# Patient Record
Sex: FEMALE | Race: BLACK OR AFRICAN AMERICAN | NOT HISPANIC OR LATINO | Employment: UNEMPLOYED | URBAN - METROPOLITAN AREA
[De-identification: names, ages, dates, MRNs, and addresses within clinical notes are randomized per-mention and may not be internally consistent; named-entity substitution may affect disease eponyms.]

---

## 2018-02-13 ENCOUNTER — HOSPITAL ENCOUNTER (OUTPATIENT)
Dept: OTHER | Facility: HOSPITAL | Age: 23
Discharge: HOME | End: 2018-02-13
Attending: PHYSICIAN ASSISTANT | Admitting: PHYSICIAN ASSISTANT

## 2018-02-13 LAB
ALBUMIN SERPL-MCNC: 3.7 G/DL (ref 3.5–4.8)
ALBUMIN/GLOB SERPL: 0.9 {RATIO} (ref 1–1.7)
ALP SERPL-CCNC: 50 IU/L (ref 32–91)
ALT SERPL-CCNC: 16 IU/L (ref 14–54)
AMPHETAMINES UR QL SCN: NEGATIVE
ANION GAP SERPL CALC-SCNC: 12.1 MMOL/L (ref 10–20)
AST SERPL-CCNC: 17 IU/L (ref 15–41)
BARBITURATES UR QL SCN: NEGATIVE
BASOPHILS # BLD AUTO: 0 10*3/UL (ref 0–0.2)
BASOPHILS NFR BLD AUTO: 0 % (ref 0–2)
BENZODIAZ UR QL SCN: NEGATIVE
BILIRUB SERPL-MCNC: 0.5 MG/DL (ref 0.3–1.2)
BUN SERPL-MCNC: 14 MG/DL (ref 8–20)
BUN/CREAT SERPL: 15.6 (ref 5.4–26.2)
BZE UR QL SCN: NEGATIVE
CALCIUM SERPL-MCNC: 9.2 MG/DL (ref 8.9–10.3)
CHLORIDE SERPL-SCNC: 102 MMOL/L (ref 101–111)
CHOLEST SERPL-MCNC: 182 MG/DL
CHOLEST/HDLC SERPL: 3.9 {RATIO}
CONV CO2: 27 MMOL/L (ref 22–32)
CONV LDL CHOLESTEROL DIRECT: 137 MG/DL (ref 0–100)
CONV TOTAL PROTEIN: 7.8 G/DL (ref 6.1–7.9)
CREAT 24H UR-MCNC: NORMAL MG/DL
CREAT UR-MCNC: 0.9 MG/DL (ref 0.4–1)
DIFFERENTIAL METHOD BLD: (no result)
EOSINOPHIL # BLD AUTO: 0.1 10*3/UL (ref 0–0.3)
EOSINOPHIL # BLD AUTO: 1 % (ref 0–3)
ERYTHROCYTE [DISTWIDTH] IN BLOOD BY AUTOMATED COUNT: 13.3 % (ref 11.5–14.5)
GLOBULIN UR ELPH-MCNC: 4.1 G/DL (ref 2.5–3.8)
GLUCOSE SERPL-MCNC: 87 MG/DL (ref 65–99)
HCT VFR BLD AUTO: 39.7 % (ref 35–49)
HDLC SERPL-MCNC: 47 MG/DL
HGB BLD-MCNC: 13.2 G/DL (ref 12–15)
LDLC/HDLC SERPL: 2.9 {RATIO}
LIPID INTERPRETATION: ABNORMAL
LYMPHOCYTES # BLD AUTO: 2.4 10*3/UL (ref 0.8–4.8)
LYMPHOCYTES NFR BLD AUTO: 41 % (ref 18–42)
MCH RBC QN AUTO: 28.8 PG (ref 26–32)
MCHC RBC AUTO-ENTMCNC: 33.1 G/DL (ref 32–36)
MCV RBC AUTO: 87 FL (ref 80–94)
METHADONE UR QL SCN: NEGATIVE
MONOCYTES # BLD AUTO: 0.4 10*3/UL (ref 0.1–1.3)
MONOCYTES NFR BLD AUTO: 8 % (ref 2–11)
NEUTROPHILS # BLD AUTO: 2.9 10*3/UL (ref 2.3–8.6)
NEUTROPHILS NFR BLD AUTO: 50 % (ref 50–75)
NRBC BLD AUTO-RTO: 0 /100{WBCS}
NRBC/RBC NFR BLD MANUAL: 0 10*3/UL
OPIATES TESTED UR SCN: NEGATIVE
PCP UR QL: NEGATIVE
PLATELET # BLD AUTO: 346 10*3/UL (ref 150–450)
PMV BLD AUTO: 8 FL (ref 7.4–10.4)
POTASSIUM SERPL-SCNC: 4.1 MMOL/L (ref 3.6–5.1)
RBC # BLD AUTO: 4.57 10*6/UL (ref 4–5.4)
SODIUM SERPL-SCNC: 137 MMOL/L (ref 136–144)
THC SERPLBLD CFM-MCNC: NEGATIVE NG/ML
TRIGL SERPL-MCNC: 40 MG/DL
VLDLC SERPL CALC-MCNC: 0 MG/DL
WBC # BLD AUTO: 5.8 10*3/UL (ref 4.5–11.5)

## 2018-02-16 LAB — NICOTINE UR-MCNC: 4 NG/ML

## 2018-03-11 ENCOUNTER — HOSPITAL ENCOUNTER (OUTPATIENT)
Dept: SLEEP MEDICINE | Facility: HOSPITAL | Age: 23
Discharge: HOME | End: 2018-03-11
Attending: INTERNAL MEDICINE | Admitting: INTERNAL MEDICINE

## 2018-03-29 ENCOUNTER — HOSPITAL ENCOUNTER (OUTPATIENT)
Dept: SLEEP MEDICINE | Facility: HOSPITAL | Age: 23
Discharge: HOME | End: 2018-03-29
Attending: INTERNAL MEDICINE | Admitting: INTERNAL MEDICINE

## 2018-05-25 ENCOUNTER — HOSPITAL ENCOUNTER (OUTPATIENT)
Dept: PREOP | Facility: HOSPITAL | Age: 23
Setting detail: HOSPITAL OUTPATIENT SURGERY
Discharge: HOME | End: 2018-05-25
Attending: SURGERY | Admitting: SURGERY

## 2018-08-29 ENCOUNTER — HOSPITAL ENCOUNTER (OUTPATIENT)
Dept: CARDIOLOGY | Facility: HOSPITAL | Age: 23
Discharge: HOME | End: 2018-08-29
Attending: INTERNAL MEDICINE | Admitting: INTERNAL MEDICINE

## 2019-07-11 LAB
EXTERNAL ABO GROUPING: NORMAL
EXTERNAL HEPATITIS B SURFACE ANTIGEN: NEGATIVE
EXTERNAL HEPATITIS C AB: NORMAL
EXTERNAL RH FACTOR: POSITIVE
EXTERNAL RUBELLA QUALITATIVE: NORMAL
EXTERNAL SYPHILIS RPR SCREEN: NORMAL
EXTERNAL VDRL: NON REACTIVE
HIV1 P24 AG SERPL QL IA: NEGATIVE

## 2019-12-03 ENCOUNTER — TELEPHONE (OUTPATIENT)
Dept: ENDOCRINOLOGY | Facility: CLINIC | Age: 24
End: 2019-12-03

## 2019-12-04 ENCOUNTER — OFFICE VISIT (OUTPATIENT)
Dept: ENDOCRINOLOGY | Facility: CLINIC | Age: 24
End: 2019-12-04

## 2019-12-04 DIAGNOSIS — O24.410 DIET CONTROLLED GESTATIONAL DIABETES MELLITUS (GDM) IN THIRD TRIMESTER: ICD-10-CM

## 2019-12-04 DIAGNOSIS — O24.419 GESTATIONAL DIABETES MELLITUS (GDM), ANTEPARTUM, GESTATIONAL DIABETES METHOD OF CONTROL UNSPECIFIED: Primary | ICD-10-CM

## 2019-12-04 PROBLEM — Z98.84 STATUS POST BARIATRIC SURGERY: Status: ACTIVE | Noted: 2018-11-05

## 2019-12-04 PROBLEM — Z01.810 PREOPERATIVE CARDIOVASCULAR EXAMINATION: Status: ACTIVE | Noted: 2018-08-14

## 2019-12-04 PROBLEM — G47.33 OBSTRUCTIVE SLEEP APNEA: Status: ACTIVE | Noted: 2018-10-16

## 2019-12-04 PROBLEM — F32.9 MAJOR DEPRESSIVE DISORDER, SINGLE EPISODE: Status: ACTIVE | Noted: 2018-02-06

## 2019-12-04 PROBLEM — R12 HEARTBURN: Status: ACTIVE | Noted: 2018-02-06

## 2019-12-04 PROBLEM — E66.01 MORBID (SEVERE) OBESITY DUE TO EXCESS CALORIES (HCC): Status: ACTIVE | Noted: 2018-02-06

## 2019-12-04 PROBLEM — R94.31 ABNORMAL EKG: Status: ACTIVE | Noted: 2018-08-14

## 2019-12-04 PROBLEM — F41.9 ANXIETY DISORDER: Status: ACTIVE | Noted: 2018-02-06

## 2019-12-04 PROBLEM — G43.909 MIGRAINE WITHOUT STATUS MIGRAINOSUS, NOT INTRACTABLE: Status: ACTIVE | Noted: 2018-02-06

## 2019-12-04 PROBLEM — Z01.818 ENCOUNTER FOR OTHER PREPROCEDURAL EXAMINATION: Status: ACTIVE | Noted: 2018-02-13

## 2019-12-04 LAB — GLUCOSE BLDC GLUCOMTR-MCNC: 69 MG/DL (ref 70–130)

## 2019-12-04 PROCEDURE — 99203 OFFICE O/P NEW LOW 30 MIN: CPT | Performed by: INTERNAL MEDICINE

## 2019-12-04 PROCEDURE — 82962 GLUCOSE BLOOD TEST: CPT | Performed by: INTERNAL MEDICINE

## 2019-12-04 RX ORDER — LANCETS 33 GAUGE
EACH MISCELLANEOUS
Qty: 100 EACH | Refills: 2 | Status: SHIPPED | OUTPATIENT
Start: 2019-12-04 | End: 2019-12-06 | Stop reason: CLARIF

## 2019-12-04 RX ORDER — FLUOXETINE HYDROCHLORIDE 20 MG/1
1 CAPSULE ORAL EVERY 24 HOURS
COMMUNITY
Start: 2018-02-06 | End: 2019-12-04

## 2019-12-04 RX ORDER — ACETAMINOPHEN, ASPIRIN AND CAFFEINE 250; 250; 65 MG/1; MG/1; MG/1
1 TABLET, FILM COATED ORAL AS NEEDED
COMMUNITY
End: 2020-01-26 | Stop reason: HOSPADM

## 2019-12-04 RX ORDER — BLOOD SUGAR DIAGNOSTIC
STRIP MISCELLANEOUS
Qty: 100 EACH | Refills: 2 | Status: SHIPPED | OUTPATIENT
Start: 2019-12-04 | End: 2019-12-06 | Stop reason: CLARIF

## 2019-12-04 RX ORDER — NORGESTIMATE AND ETHINYL ESTRADIOL 7DAYSX3 28
1 KIT ORAL DAILY
COMMUNITY
Start: 2018-03-12 | End: 2019-12-04

## 2019-12-04 RX ORDER — MONTELUKAST SODIUM 10 MG/1
10 TABLET ORAL DAILY
COMMUNITY
Start: 2018-02-06 | End: 2019-12-04

## 2019-12-04 RX ORDER — ACETAMINOPHEN 500 MG
500 TABLET ORAL AS NEEDED
COMMUNITY
End: 2020-01-26 | Stop reason: HOSPADM

## 2019-12-04 RX ORDER — FLUTICASONE PROPIONATE 50 MCG
SPRAY, SUSPENSION (ML) NASAL AS NEEDED
COMMUNITY
Start: 2017-12-11 | End: 2020-01-26 | Stop reason: HOSPADM

## 2019-12-04 RX ORDER — HYDROXYZINE HYDROCHLORIDE 25 MG/1
25 TABLET, FILM COATED ORAL
COMMUNITY
Start: 2019-03-06 | End: 2019-12-04

## 2019-12-04 RX ORDER — BUPROPION HYDROCHLORIDE 300 MG/1
300 TABLET ORAL DAILY
COMMUNITY
Start: 2017-12-11 | End: 2019-12-04

## 2019-12-04 NOTE — PATIENT INSTRUCTIONS
Attend gestational diabetes class as scheduled on 12/13 1p..  Check blood sugar fasting & 2h after meals.  Check urine for ketones every morning.  Blood sugar goals:  Under 90 fasting  Under 120 2h after meals.  Send blood sugar records every week for adjustments.

## 2019-12-06 DIAGNOSIS — O24.419 GESTATIONAL DIABETES MELLITUS (GDM), ANTEPARTUM, GESTATIONAL DIABETES METHOD OF CONTROL UNSPECIFIED: ICD-10-CM

## 2019-12-06 RX ORDER — LANCETS
EACH MISCELLANEOUS
Qty: 150 EACH | Refills: 5 | Status: SHIPPED | OUTPATIENT
Start: 2019-12-06

## 2019-12-06 RX ORDER — BLOOD-GLUCOSE METER
1 EACH MISCELLANEOUS DAILY
Qty: 1 KIT | Refills: 1 | Status: SHIPPED | OUTPATIENT
Start: 2019-12-06

## 2019-12-09 VITALS
HEART RATE: 91 BPM | SYSTOLIC BLOOD PRESSURE: 134 MMHG | DIASTOLIC BLOOD PRESSURE: 68 MMHG | WEIGHT: 253 LBS | HEIGHT: 66 IN | OXYGEN SATURATION: 96 % | BODY MASS INDEX: 40.66 KG/M2

## 2019-12-10 ENCOUNTER — TELEPHONE (OUTPATIENT)
Dept: BARIATRICS/WEIGHT MGMT | Facility: CLINIC | Age: 24
End: 2019-12-10

## 2019-12-10 NOTE — PROGRESS NOTES
[facility]    Referring Provider: Dr. Rukhsana Acuna  Reason for Consultation: Gestational Diabetes evaluation & management.    Patient Care Team:  Kash Norton MD as PCP - General    Chief complaint Gestational Diabetes (32 weeks)      Subjective .     History of present illness:    This is a  24 y.o. female with Gestational Diabetes, diagnosed on 11/21/2019. Currently @ 32 weeks gestation. Due date 1/31/2020.  This is her 1st pregnancy.   Had nausea in the beginning of the pregnancy.  Previous gestational diabetes: N/A   Had gastric sleeve bariatric surgery in October 2018. Lost 50 lb in 6 months.  Menarche @ age 9y. Menses regular. LMP in March.    Review of Systems  Review of Systems   Constitutional: Positive for unexpected weight change.   HENT: Negative for trouble swallowing.    Eyes: Positive for visual disturbance.   Respiratory: Positive for shortness of breath.         Snoring   Cardiovascular: Negative for leg swelling.   Gastrointestinal: Negative for constipation and nausea.   Endocrine: Negative for polyuria.   Genitourinary: Negative for dysuria.        Nocturia   Neurological: Positive for dizziness and headaches. Negative for tremors and numbness.     So far has gained 30 lb this pregnancy.  History  Past Medical History:   Diagnosis Date   • Gestational diabetes      Past Surgical History:   Procedure Laterality Date   • GASTRIC SLEEVE LAPAROSCOPIC       Family History   Problem Relation Age of Onset   • Diabetes Maternal Aunt    • Hypertension Maternal Aunt    • Diabetes Maternal Grandmother    • Hypertension Maternal Grandmother      Social History     Tobacco Use   • Smoking status: Never Smoker   • Smokeless tobacco: Never Used   Substance Use Topics   • Alcohol use: No     Frequency: Never   • Drug use: No           Allergies:  Patient has no known allergies.    Objective     Vital Signs   Vitals:    12/04/19 0927   BP: 134/68   Pulse: 91   SpO2: 96%        Physical Exam:     General:    Alert, cooperative, in no acute distress. Obese   Head:    Normocephalic, without obvious abnormality, atraumatic   Eyes:            Lids and lashes normal, conjunctivae and sclerae normal, PERRLA   Throat:   No oral lesions,  oral mucosa moist   Neck:   Acanthosis nigricans,  no thyromegaly, no   carotid bruit   Lungs:     Clear    Abdomen:     Normal bowel sounds, soft. 32 weeks gestation                 Extremities:   Moves all extremities well, no edema               Pulses:   Pulses palpable and equal bilaterally   Skin:   No rash   Neurologic:  DTR 1+, able to feel the 10g monofilament       Results Review  I have reviewed the patient's new clinical results, labs & imaging.    Glucose, gestational screening ( 50g ) 157  on 11/18/2019    OGTT ( 100 g ): fasting glucose 73, 1h 197 ,  2h 167 , 3h 56  on 11/21/2019    Blood sugar in clinic today 69 4h pp    Assessment/Plan     Gestational Diabetes     Attend gestational diabetes class as scheduled on 12/13 1p.  Check blood sugar fasting & 2h after meals.  Check urine for ketones every morning.  Blood sugar goals:  Under 90 fasting  Under 120 2h after meals.  Send blood sugar records every week for adjustments.    Met with diabetes educator & taught blood sugar testing. Given a meter.    I discussed the patients findings and my recommendations with patient    Sally Blood MD  12/09/19  10:00 PM

## 2019-12-13 ENCOUNTER — OFFICE VISIT (OUTPATIENT)
Dept: ENDOCRINOLOGY | Facility: CLINIC | Age: 24
End: 2019-12-13

## 2019-12-13 DIAGNOSIS — O24.410 DIET CONTROLLED GESTATIONAL DIABETES MELLITUS (GDM) IN THIRD TRIMESTER: ICD-10-CM

## 2019-12-13 PROCEDURE — G0109 DIAB MANAGE TRN IND/GROUP: HCPCS | Performed by: DIETITIAN, REGISTERED

## 2019-12-18 ENCOUNTER — TELEPHONE (OUTPATIENT)
Dept: ENDOCRINOLOGY | Facility: CLINIC | Age: 24
End: 2019-12-18

## 2019-12-26 ENCOUNTER — TELEPHONE (OUTPATIENT)
Dept: ENDOCRINOLOGY | Facility: CLINIC | Age: 24
End: 2019-12-26

## 2020-01-02 ENCOUNTER — TELEPHONE (OUTPATIENT)
Dept: ENDOCRINOLOGY | Facility: CLINIC | Age: 25
End: 2020-01-02

## 2020-01-07 LAB — EXTERNAL GROUP B STREP ANTIGEN: NEGATIVE

## 2020-01-08 ENCOUNTER — TELEPHONE (OUTPATIENT)
Dept: ENDOCRINOLOGY | Facility: CLINIC | Age: 25
End: 2020-01-08

## 2020-01-08 NOTE — TELEPHONE ENCOUNTER
BG sent  Left vm about watching carbs as there were a few scattered high BG  Will reevaluate next week  BG scanned

## 2020-01-15 ENCOUNTER — TELEPHONE (OUTPATIENT)
Dept: ENDOCRINOLOGY | Facility: CLINIC | Age: 25
End: 2020-01-15

## 2020-01-21 ENCOUNTER — TELEPHONE (OUTPATIENT)
Dept: ENDOCRINOLOGY | Facility: CLINIC | Age: 25
End: 2020-01-21

## 2020-01-22 ENCOUNTER — TELEPHONE (OUTPATIENT)
Dept: ENDOCRINOLOGY | Facility: CLINIC | Age: 25
End: 2020-01-22

## 2020-01-23 ENCOUNTER — HOSPITAL ENCOUNTER (INPATIENT)
Facility: HOSPITAL | Age: 25
LOS: 2 days | Discharge: HOME | End: 2020-01-26
Attending: OBSTETRICS & GYNECOLOGY | Admitting: OBSTETRICS & GYNECOLOGY

## 2020-01-23 PROBLEM — Z34.90 PREGNANT: Status: ACTIVE | Noted: 2020-01-23

## 2020-01-23 LAB
ABO GROUP BLD: NORMAL
BLD GP AB SCN SERPL QL: NEGATIVE
DEPRECATED RDW RBC AUTO: 43.3 FL (ref 37–54)
ERYTHROCYTE [DISTWIDTH] IN BLOOD BY AUTOMATED COUNT: 13.9 % (ref 12.3–15.4)
GLUCOSE BLDC GLUCOMTR-MCNC: 94 MG/DL (ref 70–105)
HCT VFR BLD AUTO: 38.8 % (ref 34–46.6)
HGB BLD-MCNC: 12.9 G/DL (ref 12–15.9)
HIV1+2 AB SER QL: NORMAL
MCH RBC QN AUTO: 29.2 PG (ref 26.6–33)
MCHC RBC AUTO-ENTMCNC: 33.2 G/DL (ref 31.5–35.7)
MCV RBC AUTO: 87.9 FL (ref 79–97)
PLATELET # BLD AUTO: 302 10*3/MM3 (ref 140–450)
PMV BLD AUTO: 8.9 FL (ref 6–12)
RBC # BLD AUTO: 4.42 10*6/MM3 (ref 3.77–5.28)
RH BLD: POSITIVE
T&S EXPIRATION DATE: NORMAL
WBC NRBC COR # BLD: 9 10*3/MM3 (ref 3.4–10.8)

## 2020-01-23 PROCEDURE — 86901 BLOOD TYPING SEROLOGIC RH(D): CPT

## 2020-01-23 PROCEDURE — 82962 GLUCOSE BLOOD TEST: CPT

## 2020-01-23 PROCEDURE — 86901 BLOOD TYPING SEROLOGIC RH(D): CPT | Performed by: OBSTETRICS & GYNECOLOGY

## 2020-01-23 PROCEDURE — G0432 EIA HIV-1/HIV-2 SCREEN: HCPCS | Performed by: OBSTETRICS & GYNECOLOGY

## 2020-01-23 PROCEDURE — 86592 SYPHILIS TEST NON-TREP QUAL: CPT | Performed by: OBSTETRICS & GYNECOLOGY

## 2020-01-23 PROCEDURE — 86900 BLOOD TYPING SEROLOGIC ABO: CPT

## 2020-01-23 PROCEDURE — 86850 RBC ANTIBODY SCREEN: CPT | Performed by: OBSTETRICS & GYNECOLOGY

## 2020-01-23 PROCEDURE — 86900 BLOOD TYPING SEROLOGIC ABO: CPT | Performed by: OBSTETRICS & GYNECOLOGY

## 2020-01-23 PROCEDURE — 85027 COMPLETE CBC AUTOMATED: CPT | Performed by: OBSTETRICS & GYNECOLOGY

## 2020-01-23 RX ORDER — ZOLPIDEM TARTRATE 5 MG/1
5 TABLET ORAL NIGHTLY PRN
Status: DISCONTINUED | OUTPATIENT
Start: 2020-01-23 | End: 2020-01-23

## 2020-01-23 RX ORDER — ACETAMINOPHEN 325 MG/1
650 TABLET ORAL EVERY 4 HOURS PRN
Status: DISCONTINUED | OUTPATIENT
Start: 2020-01-23 | End: 2020-01-24

## 2020-01-23 RX ORDER — OXYTOCIN-SODIUM CHLORIDE 0.9% IV SOLN 30 UNIT/500ML 30-0.9/5 UT/ML-%
2 SOLUTION INTRAVENOUS
Status: DISCONTINUED | OUTPATIENT
Start: 2020-01-24 | End: 2020-01-24

## 2020-01-23 RX ORDER — INSULIN ASPART 100 [IU]/ML
4 INJECTION, SOLUTION INTRAVENOUS; SUBCUTANEOUS
COMMUNITY
End: 2020-01-23 | Stop reason: SDUPTHER

## 2020-01-23 RX ORDER — INSULIN ASPART 100 [IU]/ML
INJECTION, SOLUTION INTRAVENOUS; SUBCUTANEOUS
Qty: 1 PEN | Refills: 0 | Status: ON HOLD | OUTPATIENT
Start: 2020-01-23 | End: 2020-01-24 | Stop reason: SDUPTHER

## 2020-01-23 RX ORDER — MAGNESIUM CARB/ALUMINUM HYDROX 105-160MG
30 TABLET,CHEWABLE ORAL ONCE
Status: DISCONTINUED | OUTPATIENT
Start: 2020-01-24 | End: 2020-01-24

## 2020-01-23 RX ORDER — BUTORPHANOL TARTRATE 1 MG/ML
1 INJECTION, SOLUTION INTRAMUSCULAR; INTRAVENOUS
Status: DISCONTINUED | OUTPATIENT
Start: 2020-01-23 | End: 2020-01-24

## 2020-01-23 NOTE — TELEPHONE ENCOUNTER
Mom called back and I explained risks of elevated BG's during pregnancy and importance of taking insulin to control her pp BG's.  Asked mom to tell pt to send in her BG's on Monday for review and possible insulin dose adjustment. Pt goes in for delivery on Friday, 1/31/20.

## 2020-01-23 NOTE — TELEPHONE ENCOUNTER
Pt emailed back that she will start on insulin but to call her mom with information. Attempted to call [phone] but recording said VM box was not set up yet. Faxed OB letter that pt agreed to start insulin.

## 2020-01-23 NOTE — TELEPHONE ENCOUNTER
Pt called upset that her OB office called her saying she needed another FBG & A1C at OB office due to her elevated pp supper BG's.  Called pt and discussed with pt that she needs to start supper time Novolog 4 units starting today even though she only has one week left.  Pt is very reluctant to start insulin. Pt states she does not want to start insulin until she talks with her mom.  Called OB office with the above info for documentation.

## 2020-01-24 ENCOUNTER — ANESTHESIA EVENT (OUTPATIENT)
Dept: LABOR AND DELIVERY | Facility: HOSPITAL | Age: 25
End: 2020-01-24

## 2020-01-24 ENCOUNTER — ANESTHESIA (OUTPATIENT)
Dept: LABOR AND DELIVERY | Facility: HOSPITAL | Age: 25
End: 2020-01-24

## 2020-01-24 PROBLEM — Z34.90 PREGNANT: Status: RESOLVED | Noted: 2020-01-23 | Resolved: 2020-01-24

## 2020-01-24 LAB
GLUCOSE BLDC GLUCOMTR-MCNC: 78 MG/DL (ref 70–105)
GLUCOSE BLDC GLUCOMTR-MCNC: 82 MG/DL (ref 70–105)
GLUCOSE BLDC GLUCOMTR-MCNC: 84 MG/DL (ref 70–105)
GLUCOSE BLDC GLUCOMTR-MCNC: 87 MG/DL (ref 70–105)
RPR SER QL: NORMAL

## 2020-01-24 PROCEDURE — 25010000002 BUTORPHANOL PER 1 MG: Performed by: OBSTETRICS & GYNECOLOGY

## 2020-01-24 PROCEDURE — C1755 CATHETER, INTRASPINAL: HCPCS | Performed by: ANESTHESIOLOGY

## 2020-01-24 PROCEDURE — 0KQM0ZZ REPAIR PERINEUM MUSCLE, OPEN APPROACH: ICD-10-PCS | Performed by: OBSTETRICS & GYNECOLOGY

## 2020-01-24 PROCEDURE — 10907ZC DRAINAGE OF AMNIOTIC FLUID, THERAPEUTIC FROM PRODUCTS OF CONCEPTION, VIA NATURAL OR ARTIFICIAL OPENING: ICD-10-PCS | Performed by: OBSTETRICS & GYNECOLOGY

## 2020-01-24 PROCEDURE — 82962 GLUCOSE BLOOD TEST: CPT

## 2020-01-24 RX ORDER — SODIUM CHLORIDE 0.9 % (FLUSH) 0.9 %
1-10 SYRINGE (ML) INJECTION AS NEEDED
Status: DISCONTINUED | OUTPATIENT
Start: 2020-01-24 | End: 2020-01-26 | Stop reason: HOSPADM

## 2020-01-24 RX ORDER — OXYTOCIN-SODIUM CHLORIDE 0.9% IV SOLN 30 UNIT/500ML 30-0.9/5 UT/ML-%
125 SOLUTION INTRAVENOUS CONTINUOUS PRN
Status: COMPLETED | OUTPATIENT
Start: 2020-01-24 | End: 2020-01-24

## 2020-01-24 RX ORDER — ONDANSETRON 4 MG/1
4 TABLET, FILM COATED ORAL EVERY 8 HOURS PRN
Status: DISCONTINUED | OUTPATIENT
Start: 2020-01-24 | End: 2020-01-26 | Stop reason: HOSPADM

## 2020-01-24 RX ORDER — DIPHENHYDRAMINE HYDROCHLORIDE 50 MG/ML
12.5 INJECTION INTRAMUSCULAR; INTRAVENOUS EVERY 8 HOURS PRN
Status: DISCONTINUED | OUTPATIENT
Start: 2020-01-24 | End: 2020-01-24

## 2020-01-24 RX ORDER — ONDANSETRON 4 MG/1
4 TABLET, FILM COATED ORAL EVERY 6 HOURS PRN
Status: DISCONTINUED | OUTPATIENT
Start: 2020-01-24 | End: 2020-01-24 | Stop reason: HOSPADM

## 2020-01-24 RX ORDER — OXYTOCIN-SODIUM CHLORIDE 0.9% IV SOLN 30 UNIT/500ML 30-0.9/5 UT/ML-%
999 SOLUTION INTRAVENOUS ONCE
Status: DISCONTINUED | OUTPATIENT
Start: 2020-01-24 | End: 2020-01-24 | Stop reason: HOSPADM

## 2020-01-24 RX ORDER — BUPIVACAINE HYDROCHLORIDE 2.5 MG/ML
INJECTION, SOLUTION EPIDURAL; INFILTRATION; INTRACAUDAL
Status: DISPENSED
Start: 2020-01-24 | End: 2020-01-25

## 2020-01-24 RX ORDER — DOCUSATE SODIUM 100 MG/1
100 CAPSULE, LIQUID FILLED ORAL 2 TIMES DAILY
Status: DISCONTINUED | OUTPATIENT
Start: 2020-01-24 | End: 2020-01-26 | Stop reason: HOSPADM

## 2020-01-24 RX ORDER — LIDOCAINE HYDROCHLORIDE AND EPINEPHRINE 10; 10 MG/ML; UG/ML
INJECTION, SOLUTION INFILTRATION; PERINEURAL AS NEEDED
Status: DISCONTINUED | OUTPATIENT
Start: 2020-01-24 | End: 2020-01-24 | Stop reason: SURG

## 2020-01-24 RX ORDER — EPHEDRINE SULFATE 50 MG/ML
5 INJECTION, SOLUTION INTRAVENOUS
Status: DISCONTINUED | OUTPATIENT
Start: 2020-01-24 | End: 2020-01-24

## 2020-01-24 RX ORDER — CARBOPROST TROMETHAMINE 250 UG/ML
250 INJECTION, SOLUTION INTRAMUSCULAR AS NEEDED
Status: DISCONTINUED | OUTPATIENT
Start: 2020-01-24 | End: 2020-01-24 | Stop reason: HOSPADM

## 2020-01-24 RX ORDER — INSULIN ASPART 100 [IU]/ML
INJECTION, SOLUTION INTRAVENOUS; SUBCUTANEOUS
Qty: 1 PEN | Refills: 0 | OUTPATIENT
Start: 2020-01-24 | End: 2020-01-26 | Stop reason: HOSPADM

## 2020-01-24 RX ORDER — OXYTOCIN-SODIUM CHLORIDE 0.9% IV SOLN 30 UNIT/500ML 30-0.9/5 UT/ML-%
250 SOLUTION INTRAVENOUS CONTINUOUS
Status: DISPENSED | OUTPATIENT
Start: 2020-01-24 | End: 2020-01-24

## 2020-01-24 RX ORDER — LANOLIN 100 %
OINTMENT (GRAM) TOPICAL
Status: DISCONTINUED | OUTPATIENT
Start: 2020-01-24 | End: 2020-01-26 | Stop reason: HOSPADM

## 2020-01-24 RX ORDER — SODIUM CHLORIDE, SODIUM LACTATE, POTASSIUM CHLORIDE, CALCIUM CHLORIDE 600; 310; 30; 20 MG/100ML; MG/100ML; MG/100ML; MG/100ML
125 INJECTION, SOLUTION INTRAVENOUS CONTINUOUS
Status: DISCONTINUED | OUTPATIENT
Start: 2020-01-24 | End: 2020-01-24

## 2020-01-24 RX ORDER — METHYLERGONOVINE MALEATE 0.2 MG/ML
200 INJECTION INTRAVENOUS ONCE AS NEEDED
Status: DISCONTINUED | OUTPATIENT
Start: 2020-01-24 | End: 2020-01-24 | Stop reason: HOSPADM

## 2020-01-24 RX ORDER — PRENATAL VIT/IRON FUM/FOLIC AC 27MG-0.8MG
1 TABLET ORAL DAILY
Status: DISCONTINUED | OUTPATIENT
Start: 2020-01-25 | End: 2020-01-26 | Stop reason: HOSPADM

## 2020-01-24 RX ORDER — MISOPROSTOL 200 UG/1
800 TABLET ORAL AS NEEDED
Status: DISCONTINUED | OUTPATIENT
Start: 2020-01-24 | End: 2020-01-24 | Stop reason: HOSPADM

## 2020-01-24 RX ORDER — ONDANSETRON 2 MG/ML
4 INJECTION INTRAMUSCULAR; INTRAVENOUS EVERY 6 HOURS PRN
Status: DISCONTINUED | OUTPATIENT
Start: 2020-01-24 | End: 2020-01-24 | Stop reason: HOSPADM

## 2020-01-24 RX ORDER — ONDANSETRON 2 MG/ML
4 INJECTION INTRAMUSCULAR; INTRAVENOUS ONCE AS NEEDED
Status: DISCONTINUED | OUTPATIENT
Start: 2020-01-24 | End: 2020-01-24

## 2020-01-24 RX ORDER — IBUPROFEN 600 MG/1
600 TABLET ORAL EVERY 6 HOURS PRN
Status: DISCONTINUED | OUTPATIENT
Start: 2020-01-24 | End: 2020-01-26 | Stop reason: HOSPADM

## 2020-01-24 RX ADMIN — WITCH HAZEL 1 PAD: 500 SOLUTION RECTAL; TOPICAL at 20:27

## 2020-01-24 RX ADMIN — Medication 10 ML/HR: at 04:51

## 2020-01-24 RX ADMIN — BENZOCAINE 57 SPRAY: 11.4 AEROSOL, SPRAY TOPICAL at 20:28

## 2020-01-24 RX ADMIN — EPHEDRINE SULFATE 5 MG: 50 INJECTION, SOLUTION INTRAVENOUS at 05:34

## 2020-01-24 RX ADMIN — SODIUM CHLORIDE, SODIUM LACTATE, POTASSIUM CHLORIDE, AND CALCIUM CHLORIDE 1000 ML: 600; 310; 30; 20 INJECTION, SOLUTION INTRAVENOUS at 00:11

## 2020-01-24 RX ADMIN — OXYTOCIN 125 ML/HR: 10 INJECTION INTRAVENOUS at 20:27

## 2020-01-24 RX ADMIN — BUTORPHANOL TARTRATE 1 MG: 1 INJECTION, SOLUTION INTRAMUSCULAR; INTRAVENOUS at 02:58

## 2020-01-24 RX ADMIN — SODIUM CHLORIDE, SODIUM LACTATE, POTASSIUM CHLORIDE, AND CALCIUM CHLORIDE 125 ML/HR: 600; 310; 30; 20 INJECTION, SOLUTION INTRAVENOUS at 03:45

## 2020-01-24 RX ADMIN — LIDOCAINE HYDROCHLORIDE,EPINEPHRINE BITARTRATE 5 ML: 10; .01 INJECTION, SOLUTION INFILTRATION; PERINEURAL at 04:50

## 2020-01-24 NOTE — ANESTHESIA PROCEDURE NOTES
Labor Epidural    Pre-sedation assessment completed: 1/24/2020 4:43 AM    Patient reassessed immediately prior to procedure    Patient location during procedure: OB  Start Time: 1/24/2020 4:43 AM  Indication:at surgeon's request  Performed By  Anesthesiologist: Everardo Lara DO  Preanesthetic Checklist  Completed: patient identified, site marked, surgical consent, pre-op evaluation, timeout performed, IV checked, risks and benefits discussed and monitors and equipment checked  Prep:  Pt Position:sitting  Sterile Tech:cap, gloves, mask and sterile barrier  Prep:chlorhexidine gluconate and isopropyl alcohol  Monitoring:blood pressure monitoring, continuous pulse oximetry and EKG  Epidural Block Procedure:  Approach:midline  Guidance:palpation technique  Location:L3-L4  Needle Type:Tuohy  Needle Gauge:17 G  Loss of Resistance Medium: air  Loss of Resistance: 7cm  Cath Depth at skin:8 cm  Paresthesia: none  Aspiration:negative  Test Dose:negative  Number of Attempts: 1  Post Assessment:  Dressing:occlusive dressing applied and secured with tape  Pt Tolerance:patient tolerated the procedure well with no apparent complications  Complications:no

## 2020-01-24 NOTE — ANESTHESIA PREPROCEDURE EVALUATION
Anesthesia Evaluation     Patient summary reviewed and Nursing notes reviewed                Airway   Mallampati: I  TM distance: >3 FB  Neck ROM: full  No difficulty expected  Dental - normal exam     Pulmonary - normal exam   (+) sleep apnea on CPAP,   Cardiovascular - negative cardio ROS and normal exam        Neuro/Psych  (+) headaches, psychiatric history Anxiety,     GI/Hepatic/Renal/Endo    (+) obesity, morbid obesity,  diabetes mellitus type 2 well controlled using insulin,     Musculoskeletal (-) negative ROS    Abdominal  - normal exam    Bowel sounds: normal.   Substance History - negative use     OB/GYN    (+) Pregnant,         Other                        Anesthesia Plan    ASA 3     epidural       Anesthetic plan, all risks, benefits, and alternatives have been provided, discussed and informed consent has been obtained with: patient.

## 2020-01-25 LAB
BASOPHILS # BLD AUTO: 0 10*3/MM3 (ref 0–0.2)
BASOPHILS NFR BLD AUTO: 0.1 % (ref 0–1.5)
DEPRECATED RDW RBC AUTO: 42.4 FL (ref 37–54)
EOSINOPHIL # BLD AUTO: 0 10*3/MM3 (ref 0–0.4)
EOSINOPHIL NFR BLD AUTO: 0 % (ref 0.3–6.2)
ERYTHROCYTE [DISTWIDTH] IN BLOOD BY AUTOMATED COUNT: 13.9 % (ref 12.3–15.4)
HCT VFR BLD AUTO: 29 % (ref 34–46.6)
HGB BLD-MCNC: 9.6 G/DL (ref 12–15.9)
LYMPHOCYTES # BLD AUTO: 2.1 10*3/MM3 (ref 0.7–3.1)
LYMPHOCYTES NFR BLD AUTO: 12.1 % (ref 19.6–45.3)
MCH RBC QN AUTO: 28.6 PG (ref 26.6–33)
MCHC RBC AUTO-ENTMCNC: 33.1 G/DL (ref 31.5–35.7)
MCV RBC AUTO: 86.6 FL (ref 79–97)
MONOCYTES # BLD AUTO: 1.4 10*3/MM3 (ref 0.1–0.9)
MONOCYTES NFR BLD AUTO: 8 % (ref 5–12)
NEUTROPHILS # BLD AUTO: 13.5 10*3/MM3 (ref 1.7–7)
NEUTROPHILS NFR BLD AUTO: 79.8 % (ref 42.7–76)
NRBC BLD AUTO-RTO: 0.1 /100 WBC (ref 0–0.2)
PLATELET # BLD AUTO: 228 10*3/MM3 (ref 140–450)
PMV BLD AUTO: 8.7 FL (ref 6–12)
RBC # BLD AUTO: 3.35 10*6/MM3 (ref 3.77–5.28)
WBC NRBC COR # BLD: 17 10*3/MM3 (ref 3.4–10.8)

## 2020-01-25 PROCEDURE — 85025 COMPLETE CBC W/AUTO DIFF WBC: CPT | Performed by: OBSTETRICS & GYNECOLOGY

## 2020-01-25 RX ADMIN — DOCUSATE SODIUM 100 MG: 100 CAPSULE, LIQUID FILLED ORAL at 09:24

## 2020-01-25 RX ADMIN — DOCUSATE SODIUM 100 MG: 100 CAPSULE, LIQUID FILLED ORAL at 20:05

## 2020-01-25 RX ADMIN — WITCH HAZEL 1 PAD: 500 SOLUTION RECTAL; TOPICAL at 09:24

## 2020-01-25 RX ADMIN — PRENATAL VIT W/ FE FUMARATE-FA TAB 27-0.8 MG 1 TABLET: 27-0.8 TAB at 09:24

## 2020-01-26 VITALS
HEART RATE: 96 BPM | HEIGHT: 66 IN | WEIGHT: 268.96 LBS | DIASTOLIC BLOOD PRESSURE: 77 MMHG | SYSTOLIC BLOOD PRESSURE: 115 MMHG | TEMPERATURE: 98.5 F | OXYGEN SATURATION: 96 % | RESPIRATION RATE: 16 BRPM | BODY MASS INDEX: 43.23 KG/M2

## 2020-01-26 RX ORDER — ACETAMINOPHEN 325 MG/1
650 TABLET ORAL EVERY 6 HOURS PRN
Status: DISCONTINUED | OUTPATIENT
Start: 2020-01-26 | End: 2020-01-26 | Stop reason: HOSPADM

## 2020-01-26 RX ORDER — ACETAMINOPHEN 325 MG/1
650 TABLET ORAL EVERY 6 HOURS
Status: DISCONTINUED | OUTPATIENT
Start: 2020-01-26 | End: 2020-01-26

## 2020-01-26 RX ADMIN — ACETAMINOPHEN 650 MG: 325 TABLET, FILM COATED ORAL at 07:05

## 2020-01-26 RX ADMIN — PRENATAL VIT W/ FE FUMARATE-FA TAB 27-0.8 MG 1 TABLET: 27-0.8 TAB at 08:53

## 2020-01-26 RX ADMIN — DOCUSATE SODIUM 100 MG: 100 CAPSULE, LIQUID FILLED ORAL at 08:53

## 2020-01-27 ENCOUNTER — TELEPHONE (OUTPATIENT)
Dept: ENDOCRINOLOGY | Facility: CLINIC | Age: 25
End: 2020-01-27

## 2020-01-27 NOTE — TELEPHONE ENCOUNTER
Pt emailed that she delivered a week early and asked for instructions.  See email scanned into phone note.

## 2020-01-29 ENCOUNTER — TELEPHONE (OUTPATIENT)
Dept: ENDOCRINOLOGY | Facility: CLINIC | Age: 25
End: 2020-01-29

## 2020-03-03 ENCOUNTER — TELEPHONE (OUTPATIENT)
Dept: BARIATRICS/WEIGHT MGMT | Facility: CLINIC | Age: 25
End: 2020-03-03

## 2020-03-28 ENCOUNTER — HOSPITAL ENCOUNTER (EMERGENCY)
Facility: HOSPITAL | Age: 25
Discharge: HOME | End: 2020-03-28
Admitting: NURSE PRACTITIONER

## 2020-03-28 ENCOUNTER — APPOINTMENT (OUTPATIENT)
Dept: GENERAL RADIOLOGY | Facility: HOSPITAL | Age: 25
End: 2020-03-28

## 2020-03-28 VITALS
TEMPERATURE: 98 F | HEART RATE: 88 BPM | DIASTOLIC BLOOD PRESSURE: 78 MMHG | BODY MASS INDEX: 37.88 KG/M2 | OXYGEN SATURATION: 99 % | RESPIRATION RATE: 16 BRPM | WEIGHT: 235.67 LBS | SYSTOLIC BLOOD PRESSURE: 133 MMHG | HEIGHT: 66 IN

## 2020-03-28 DIAGNOSIS — M25.561 CHRONIC PAIN OF RIGHT KNEE: Primary | ICD-10-CM

## 2020-03-28 DIAGNOSIS — G89.29 CHRONIC PAIN OF RIGHT KNEE: Primary | ICD-10-CM

## 2020-03-28 PROCEDURE — 99283 EMERGENCY DEPT VISIT LOW MDM: CPT

## 2020-03-28 PROCEDURE — 73562 X-RAY EXAM OF KNEE 3: CPT

## 2020-03-28 RX ORDER — CETIRIZINE HYDROCHLORIDE 10 MG/1
10 TABLET ORAL DAILY
COMMUNITY

## 2020-03-28 RX ORDER — BUPROPION HYDROCHLORIDE 150 MG/1
150 TABLET ORAL DAILY
COMMUNITY

## 2021-06-07 ENCOUNTER — TRANSCRIBE ORDERS (OUTPATIENT)
Dept: ADMINISTRATIVE | Facility: HOSPITAL | Age: 26
End: 2021-06-07

## 2021-06-07 DIAGNOSIS — N63.21 MASS OF UPPER OUTER QUADRANT OF LEFT BREAST: Primary | ICD-10-CM

## 2021-06-25 ENCOUNTER — HOSPITAL ENCOUNTER (OUTPATIENT)
Dept: ULTRASOUND IMAGING | Facility: HOSPITAL | Age: 26
Discharge: HOME | End: 2021-06-25

## 2021-06-25 ENCOUNTER — HOSPITAL ENCOUNTER (OUTPATIENT)
Dept: MAMMOGRAPHY | Facility: HOSPITAL | Age: 26
Discharge: HOME | End: 2021-06-25

## 2021-06-25 DIAGNOSIS — N63.21 MASS OF UPPER OUTER QUADRANT OF LEFT BREAST: ICD-10-CM

## 2021-06-25 PROCEDURE — 76642 ULTRASOUND BREAST LIMITED: CPT

## 2022-08-18 ENCOUNTER — APPOINTMENT (OUTPATIENT)
Dept: CT IMAGING | Facility: HOSPITAL | Age: 27
End: 2022-08-18

## 2022-08-18 ENCOUNTER — HOSPITAL ENCOUNTER (EMERGENCY)
Facility: HOSPITAL | Age: 27
Discharge: HOME | End: 2022-08-18
Attending: EMERGENCY MEDICINE | Admitting: EMERGENCY MEDICINE

## 2022-08-18 VITALS
DIASTOLIC BLOOD PRESSURE: 94 MMHG | OXYGEN SATURATION: 98 % | RESPIRATION RATE: 20 BRPM | BODY MASS INDEX: 43.69 KG/M2 | HEART RATE: 81 BPM | HEIGHT: 66 IN | TEMPERATURE: 98.5 F | WEIGHT: 271.83 LBS | SYSTOLIC BLOOD PRESSURE: 150 MMHG

## 2022-08-18 DIAGNOSIS — Z20.822 COVID-19 VIRUS NOT DETECTED: ICD-10-CM

## 2022-08-18 DIAGNOSIS — R51.9 ACUTE NONINTRACTABLE HEADACHE, UNSPECIFIED HEADACHE TYPE: Primary | ICD-10-CM

## 2022-08-18 LAB
B-HCG UR QL: NEGATIVE
SARS-COV-2 RNA PNL SPEC NAA+PROBE: NOT DETECTED

## 2022-08-18 PROCEDURE — 70450 CT HEAD/BRAIN W/O DYE: CPT

## 2022-08-18 PROCEDURE — 87635 SARS-COV-2 COVID-19 AMP PRB: CPT | Performed by: NURSE PRACTITIONER

## 2022-08-18 PROCEDURE — 81025 URINE PREGNANCY TEST: CPT | Performed by: NURSE PRACTITIONER

## 2022-08-18 PROCEDURE — 25010000002 DIPHENHYDRAMINE PER 50 MG: Performed by: NURSE PRACTITIONER

## 2022-08-18 PROCEDURE — 25010000002 PROCHLORPERAZINE 10 MG/2ML SOLUTION: Performed by: NURSE PRACTITIONER

## 2022-08-18 PROCEDURE — 99283 EMERGENCY DEPT VISIT LOW MDM: CPT

## 2022-08-18 PROCEDURE — 96374 THER/PROPH/DIAG INJ IV PUSH: CPT

## 2022-08-18 PROCEDURE — 96375 TX/PRO/DX INJ NEW DRUG ADDON: CPT

## 2022-08-18 PROCEDURE — 25010000002 KETOROLAC TROMETHAMINE PER 15 MG: Performed by: NURSE PRACTITIONER

## 2022-08-18 RX ORDER — KETOROLAC TROMETHAMINE 15 MG/ML
15 INJECTION, SOLUTION INTRAMUSCULAR; INTRAVENOUS ONCE
Status: COMPLETED | OUTPATIENT
Start: 2022-08-18 | End: 2022-08-18

## 2022-08-18 RX ORDER — SODIUM CHLORIDE 0.9 % (FLUSH) 0.9 %
10 SYRINGE (ML) INJECTION AS NEEDED
Status: DISCONTINUED | OUTPATIENT
Start: 2022-08-18 | End: 2022-08-18 | Stop reason: HOSPADM

## 2022-08-18 RX ORDER — DIPHENHYDRAMINE HYDROCHLORIDE 50 MG/ML
25 INJECTION INTRAMUSCULAR; INTRAVENOUS ONCE
Status: COMPLETED | OUTPATIENT
Start: 2022-08-18 | End: 2022-08-18

## 2022-08-18 RX ORDER — PROCHLORPERAZINE EDISYLATE 5 MG/ML
10 INJECTION INTRAMUSCULAR; INTRAVENOUS ONCE
Status: COMPLETED | OUTPATIENT
Start: 2022-08-18 | End: 2022-08-18

## 2022-08-18 RX ADMIN — SODIUM CHLORIDE 500 ML: 9 INJECTION, SOLUTION INTRAVENOUS at 11:22

## 2022-08-18 RX ADMIN — SODIUM CHLORIDE 500 ML: 9 INJECTION, SOLUTION INTRAVENOUS at 12:38

## 2022-08-18 RX ADMIN — DIPHENHYDRAMINE HYDROCHLORIDE 25 MG: 50 INJECTION, SOLUTION INTRAMUSCULAR; INTRAVENOUS at 11:22

## 2022-08-18 RX ADMIN — KETOROLAC TROMETHAMINE 15 MG: 15 INJECTION, SOLUTION INTRAMUSCULAR; INTRAVENOUS at 11:22

## 2022-08-18 RX ADMIN — PROCHLORPERAZINE EDISYLATE 10 MG: 5 INJECTION INTRAMUSCULAR; INTRAVENOUS at 11:22

## 2022-08-18 NOTE — ED NOTES
Patient reports migraine x 2 weeks. Nausea and vomiting on and off. Has taken OTC medications with no relief. Has photophobia and sound sensitivity

## 2022-08-18 NOTE — ED PROVIDER NOTES
Subjective   Patient is a 26-year-old -American female with history of migraine headaches who presents today with complaints of a headache.  She states this headache started 2 weeks ago.  She states her headache has been all day every day.  She reports light and sound sensitivity.  She denies any dizziness or visual changes.  She denies neck pain stiffness.  She denies any unilateral weakness or deficit.  She denies any fever chills cough congestion sore throat or other complaint.  She states she did see her primary care provider yesterday who started her on Topamax.  She states she has not gotten this filled yet.          Review of Systems   Constitutional: Negative.    Eyes: Positive for photophobia.   Respiratory: Negative.    Cardiovascular: Negative.    Gastrointestinal: Positive for nausea. Negative for vomiting.   Genitourinary: Negative.    Musculoskeletal: Negative.    Skin: Negative.    Neurological: Positive for headaches. Negative for dizziness, weakness and light-headedness.       Past Medical History:   Diagnosis Date   • Gestational diabetes    • Migraine        No Known Allergies    Past Surgical History:   Procedure Laterality Date   • GASTRIC SLEEVE LAPAROSCOPIC         Family History   Problem Relation Age of Onset   • Diabetes Maternal Aunt    • Hypertension Maternal Aunt    • Diabetes Maternal Grandmother    • Hypertension Maternal Grandmother        Social History     Socioeconomic History   • Marital status: Single   Tobacco Use   • Smoking status: Never Smoker   • Smokeless tobacco: Never Used   Substance and Sexual Activity   • Alcohol use: No   • Drug use: No   • Sexual activity: Yes           Objective   Physical Exam  Vital signs and triage nurse note reviewed.  Constitutional: Awake, alert; well-developed and well-nourished. No acute distress is noted.  HEENT: Normocephalic, atraumatic; pupils are PERRL with intact EOM; oropharynx is pink and moist without exudate or erythema.  No  drooling or pooling of oral secretions.  Neck: Supple, full range of motion without pain; no cervical lymphadenopathy. Normal phonation.  Cardiovascular: Regular rate and rhythm, normal S1-S2.  No murmur noted.  Pulmonary: Respiratory effort regular nonlabored, breath sounds clear to auscultation all fields.  Abdomen: Soft, nontender, nondistended with normoactive bowel sounds; no rebound or guarding.  Musculoskeletal: Independent range of motion of all extremities with no palpable tenderness or edema.  Neuro: Alert oriented x3, speech is clear and appropriate, GCS 15.    Skin: Flesh tone, warm, dry, intact; no erythematous or petechial rash or lesion.      Procedures           ED Course      Labs Reviewed   COVID-19,CEPHEID/DAVY,COR/KP/PAD/NICOLE IN-HOUSE,NP SWAB IN TRANSPORT MEDIA 3-4 HR TAT, RT-PCR - Normal    Narrative:     Fact sheet for providers: https://www.fda.gov/media/584408/download     Fact sheet for patients: https://www.fda.gov/media/341104/download  Fact sheet for providers: https://www.fda.gov/media/766843/download    Fact sheet for patients: https://www.fda.gov/media/803947/download    Test performed by PCR.   PREGNANCY, URINE - Normal     CT Head Without Contrast    Result Date: 8/18/2022  Normal study.  Electronically Signed By-Taiwo Westbrook MD On:8/18/2022 1:15 PM This report was finalized on [id number] by  Taiwo Westbrook MD.    Medications   sodium chloride 0.9 % flush 10 mL (has no administration in time range)   sodium chloride 0.9 % bolus 500 mL (0 mL Intravenous Stopped 8/18/22 1235)   diphenhydrAMINE (BENADRYL) injection 25 mg (25 mg Intravenous Given 8/18/22 1122)   prochlorperazine (COMPAZINE) injection 10 mg (10 mg Intravenous Given 8/18/22 1122)   ketorolac (TORADOL) injection 15 mg (15 mg Intravenous Given 8/18/22 1122)   sodium chloride 0.9 % bolus 500 mL ( Intravenous Currently Infusing 8/18/22 1330)                                          MDM  Number of Diagnoses or Management  Options  Acute nonintractable headache, unspecified headache type  COVID-19 virus not detected  Diagnosis management comments: Patient had an IV established.  She had labs and CT obtained.  She was given a liter of normal saline as well as Toradol Compazine and Benadryl.    Work-up: Urine hCG negative.  COVID-negative.  CT shows no acute abnormality.    On reexamination patient is resting comfortably in no distress.  She has no new complaints.  She says she is feeling better her headache is now is completely gone.  She remains neurologically stable and ambulatory without difficulty.    Diagnosis and treatment plan discussed with patient.  Patient agreeable to plan.   I discussed findings with patient who voices understanding of discharge instructions, signs and symptoms requiring return to ED; discharged improved and in stable condition with follow up for re-evaluation.         Amount and/or Complexity of Data Reviewed  Clinical lab tests: reviewed and ordered  Tests in the radiology section of CPT®: reviewed and ordered    Patient Progress  Patient progress: stable      Final diagnoses:   Acute nonintractable headache, unspecified headache type   COVID-19 virus not detected       ED Disposition  ED Disposition     ED Disposition   Discharge    Condition   Stable    Comment   --             Yamile Pritchett, APRN  [address] [facility] [place] [address]  [phone]               Medication List      No changes were made to your prescriptions during this visit.          Cecille Razo, PREMA  08/18/22 2705

## 2022-08-18 NOTE — DISCHARGE INSTRUCTIONS
Continue current home medications.  Start Topamax as previously prescribed.  Drink plenty of fluids.  Follow-up with your primary care provider.  Return for new or worsening symptoms.

## 2023-05-02 NOTE — PROGRESS NOTES
Chief Complaint  Migraine    Subjective            Lizette Nerissa Jones presents to [facility] for MIGRAINES  History of Present Illness  New patient referred by Yamile LLOYD for migraines    Had sinus headaches off and on. And a migraine once in a while.   Migraines started last June. With more severe ha and light and noise sensitivity with nausea.  She has one every 2 weeks.  Her last was was about 2 weeks ago.  Topamax started last fall, ha less frequently, ha now about once every two or three weeks migraine ha only.   Ubrelvy and seem to be helping. Ha resolved in about 30 min.   Before medication she was sensative to light and would get nausea when she has a migraine.    She also has knot on her head she wants addressed. She has talked to PCP about it. This is moveable and not tender or red  Skull bones normal on ct of head      =-=====CT HEAD WO CONTRAST 8/18/22===  IMPRESSION:  Normal study.        Family History   Problem Relation Age of Onset   • Migraines Mother    • Diabetes Maternal Aunt    • Hypertension Maternal Aunt    • Diabetes Maternal Grandmother    • Hypertension Maternal Grandmother        Past Medical History:   Diagnosis Date   • Gestational diabetes    • Migraine    • Sleep apnea 5/2016       Social History     Socioeconomic History   • Marital status: Single   Tobacco Use   • Smoking status: Never   • Smokeless tobacco: Never   Substance and Sexual Activity   • Alcohol use: No   • Drug use: No   • Sexual activity: Yes     Birth control/protection: Pill, Birth control pill         Current Outpatient Medications:   •  cetirizine (zyrTEC) 10 MG tablet, Take 1 tablet by mouth Daily., Disp: , Rfl:   •  desvenlafaxine (PRISTIQ) 100 MG 24 hr tablet, , Disp: , Rfl:   •  Melatonin 5 MG chewable tablet, Chew 1 tablet 2 (Two) Times a Day., Disp: , Rfl:   •  norethindrone-ethinyl estradiol-iron (MICROGESTIN FE1.5/30) 1.5-30 MG-MCG tablet, , Disp: , Rfl:   •   "topiramate (TOPAMAX) 100 MG tablet, , Disp: , Rfl:   •  ubrogepant (UBRELVY) 100 MG tablet, Take 1 tablet by mouth., Disp: , Rfl:     Review of Systems   Psychiatric/Behavioral: The patient is nervous/anxious.    All other systems reviewed and are negative.           Objective   Vital Signs:   /75   Pulse 84   Ht 167.6 cm (66\")   Wt 122 kg (268 lb)   BMI 43.26 kg/m²     Physical Exam  Vitals reviewed.   Constitutional:       Appearance: Normal appearance.   HENT:      Head: Normocephalic.      Nose: Nose normal.   Eyes:      Extraocular Movements: Extraocular movements intact.      Pupils: Pupils are equal, round, and reactive to light.   Cardiovascular:      Rate and Rhythm: Normal rate.      Heart sounds: Normal heart sounds.   Pulmonary:      Effort: Pulmonary effort is normal. No respiratory distress.   Neurological:      General: No focal deficit present.      Mental Status: She is alert and oriented to person, place, and time.   Psychiatric:         Mood and Affect: Mood normal.        Result Review :                Neurologic Exam     Mental Status   Oriented to person, place, and time.     Cranial Nerves     CN III, IV, VI   Pupils are equal, round, and reactive to light.             Assessment and Plan    Diagnoses and all orders for this visit:    1. Migraine without aura and without status migrainosus, not intractable (Primary)    pt is doing well with the Topamax 100 mg per day if continues to do well over next 6 months,  can decrease to 50 mg per day   Continue ubrelvy prn migraine   Recommend aerobic exercise 3 to 4 times per week.       Follow Up   Return if symptoms worsen or fail to improve.  Patient was given instructions and counseling regarding her condition or for health maintenance advice. Please see specific information pulled into the AVS if appropriate.         This document has been electronically signed by Joseph Seipel, MD on May 3, 2023 13:32 EDT      "

## 2023-05-03 ENCOUNTER — OFFICE VISIT (OUTPATIENT)
Dept: NEUROLOGY | Facility: CLINIC | Age: 28
End: 2023-05-03
Payer: MEDICAID

## 2023-05-03 VITALS
DIASTOLIC BLOOD PRESSURE: 75 MMHG | WEIGHT: 268 LBS | BODY MASS INDEX: 43.07 KG/M2 | HEIGHT: 66 IN | SYSTOLIC BLOOD PRESSURE: 111 MMHG | HEART RATE: 84 BPM

## 2023-05-03 DIAGNOSIS — G43.009 MIGRAINE WITHOUT AURA AND WITHOUT STATUS MIGRAINOSUS, NOT INTRACTABLE: Primary | ICD-10-CM

## 2023-05-03 PROCEDURE — 99203 OFFICE O/P NEW LOW 30 MIN: CPT | Performed by: PSYCHIATRY & NEUROLOGY

## 2023-05-03 PROCEDURE — 1160F RVW MEDS BY RX/DR IN RCRD: CPT | Performed by: PSYCHIATRY & NEUROLOGY

## 2023-05-03 PROCEDURE — 1159F MED LIST DOCD IN RCRD: CPT | Performed by: PSYCHIATRY & NEUROLOGY

## 2023-05-03 RX ORDER — TOPIRAMATE 100 MG/1
TABLET, FILM COATED ORAL
COMMUNITY
Start: 2023-01-05

## 2023-05-03 RX ORDER — NORETHINDRONE ACETATE AND ETHINYL ESTRADIOL 1.5-30(21)
KIT ORAL
COMMUNITY
Start: 2021-06-07

## 2023-05-03 RX ORDER — MELATONIN 5 MG
5 TABLET,CHEWABLE ORAL 2 TIMES DAILY
COMMUNITY

## 2023-05-03 RX ORDER — DESVENLAFAXINE 100 MG/1
TABLET, EXTENDED RELEASE ORAL
COMMUNITY
Start: 2023-04-27

## 2023-05-05 ENCOUNTER — PATIENT ROUNDING (BHMG ONLY) (OUTPATIENT)
Dept: NEUROLOGY | Facility: CLINIC | Age: 28
End: 2023-05-05
Payer: MEDICAID

## 2023-08-28 ENCOUNTER — OFFICE VISIT (OUTPATIENT)
Dept: ENDOCRINOLOGY | Facility: CLINIC | Age: 28
End: 2023-08-28
Payer: MEDICAID

## 2023-08-28 ENCOUNTER — SPECIALTY PHARMACY (OUTPATIENT)
Dept: ENDOCRINOLOGY | Facility: CLINIC | Age: 28
End: 2023-08-28
Payer: MEDICAID

## 2023-08-28 VITALS
DIASTOLIC BLOOD PRESSURE: 75 MMHG | WEIGHT: 271 LBS | HEART RATE: 86 BPM | SYSTOLIC BLOOD PRESSURE: 110 MMHG | OXYGEN SATURATION: 97 % | BODY MASS INDEX: 43.55 KG/M2 | HEIGHT: 66 IN

## 2023-08-28 DIAGNOSIS — R73.03 PREDIABETES: Primary | ICD-10-CM

## 2023-08-28 DIAGNOSIS — E66.01 MORBID OBESITY: ICD-10-CM

## 2023-08-28 DIAGNOSIS — E88.81 METABOLIC SYNDROME: ICD-10-CM

## 2023-08-28 DIAGNOSIS — E78.2 MIXED HYPERLIPIDEMIA: ICD-10-CM

## 2023-08-28 LAB — GLUCOSE BLDC GLUCOMTR-MCNC: 115 MG/DL (ref 70–105)

## 2023-08-28 PROCEDURE — 82948 REAGENT STRIP/BLOOD GLUCOSE: CPT | Performed by: INTERNAL MEDICINE

## 2023-08-28 RX ORDER — HYDROXYZINE HYDROCHLORIDE 25 MG/1
TABLET, FILM COATED ORAL
COMMUNITY
Start: 2023-06-02

## 2023-08-28 RX ORDER — PROPRANOLOL HCL 60 MG
CAPSULE, EXTENDED RELEASE 24HR ORAL
COMMUNITY
Start: 2023-08-21

## 2023-08-28 RX ORDER — SEMAGLUTIDE 0.25 MG/.5ML
0.25 INJECTION, SOLUTION SUBCUTANEOUS WEEKLY
Qty: 2 ML | Refills: 0
Start: 2023-08-28 | End: 2023-08-30 | Stop reason: CLARIF

## 2023-08-28 RX ORDER — SEMAGLUTIDE 0.5 MG/.5ML
0.5 INJECTION, SOLUTION SUBCUTANEOUS WEEKLY
Qty: 2 ML | Refills: 1
Start: 2023-08-28 | End: 2023-08-30 | Stop reason: CLARIF

## 2023-08-28 NOTE — PATIENT INSTRUCTIONS
Please,    - Start Wegovy or Ozempic therapy:     START 0.25mg weekly x 4 weeks     Then 0.5mg weekly thereafter    - Repeat A1c before next visit    - Please make appointment for diabetic education or dietitian services.    Thank you for your visit today.    If you have any questions or concerns please feel free to reach out of the office.

## 2023-08-28 NOTE — PROGRESS NOTES
-----------------------------------------------------------------  ENDOCRINE CLINIC NOTE  -----------------------------------------------------------------        PATIENT NAME: Lizette Jones  PATIENT : 1995 AGE: 27 y.o.  MRN NUMBER: [id number]  PRIMARY CARE: Yamile Pritchett APRN    ==========================================================================    CHIEF COMPLAINT: Prediabetes  DATE OF SERVICE: 2023         ==========================================================================    HPI / SUBJECTIVE    27 y.o. female  is seen in the clinic today for evaluation management of prediabetes.  Patient was diagnosed with prediabetes on 2023 with A1c of 5.9%.  Patient was first diagnosed with prediabetes during her pregnancy 3 years ago.  Denied any use of insulin during that time..  Patient was managed with lifestyle modification.  Family history significant for type 2 diabetes.  Patient is currently consuming 2 meals a day.  Also have 2 snacks a day.  Have a 3-year-old at home as well.  Patient have a physically active job.  Patient has been struggling with obesity throughout her entire life.  Patient used to have regular cycles prior to pregnancy.  Patient is currently maintained on oral contraceptive therapy.    ==========================================================================                                                PAST MEDICAL HISTORY    Past Medical History:   Diagnosis Date    Diabetes     Gestational diabetes     Migraine     Sleep apnea 2016       ==========================================================================    PAST SURGICAL HISTORY    Past Surgical History:   Procedure Laterality Date    GASTRIC SLEEVE LAPAROSCOPIC         ==========================================================================    FAMILY HISTORY    Family History   Problem Relation Age of Onset    Migraines Mother     Diabetes Maternal Aunt     Hypertension  Maternal Aunt     Diabetes Maternal Grandmother     Hypertension Maternal Grandmother        ==========================================================================    SOCIAL HISTORY    Social History     Socioeconomic History    Marital status: Single    Number of children: 1    Years of education: 14   Tobacco Use    Smoking status: Never    Smokeless tobacco: Never   Vaping Use    Vaping Use: Never used   Substance and Sexual Activity    Alcohol use: No    Drug use: No    Sexual activity: Yes     Birth control/protection: Pill, Birth control pill       ==========================================================================    MEDICATIONS      Current Outpatient Medications:     cetirizine (zyrTEC) 10 MG tablet, Take 1 tablet by mouth Daily., Disp: , Rfl:     desvenlafaxine (PRISTIQ) 100 MG 24 hr tablet, , Disp: , Rfl:     hydrOXYzine (ATARAX) 25 MG tablet, , Disp: , Rfl:     Melatonin 5 MG chewable tablet, Chew 1 tablet 2 (Two) Times a Day., Disp: , Rfl:     norethindrone-ethinyl estradiol-iron (MICROGESTIN FE1.5/30) 1.5-30 MG-MCG tablet, , Disp: , Rfl:     propranolol LA (INDERAL LA) 60 MG 24 hr capsule, , Disp: , Rfl:     topiramate (TOPAMAX) 100 MG tablet, , Disp: , Rfl:     ubrogepant (UBRELVY) 100 MG tablet, Take 1 tablet by mouth., Disp: , Rfl:     ==========================================================================    ALLERGIES    No Known Allergies    ==========================================================================    OBJECTIVE    Vitals:    08/28/23 1335   BP: 110/75   Pulse: 86   SpO2: 97%     Body mass index is 43.74 kg/mý.     General: Alert, cooperative, no acute distress  Thyroid:  no enlargement/tenderness/palpable nodules  Lungs: Clear to auscultation bilaterally, respirations unlabored  Heart: Regular rate and rhythm, S1 and S2 normal, no murmur, rub or gallop  Abdomen: Soft, NT, ND and Bowel sounds Positive  Extremities:  Extremities normal, atraumatic, no cyanosis or  edema    ==========================================================================    LAB EVALUATION    Lab Results   Component Value Date    GLUCOSE 106 (H) 11/01/2018    BUN 5 (L) 11/01/2018    CREATININE 0.9 11/01/2018    BCR 5.6 11/01/2018    K 3.8 11/01/2018    CO2 26 11/01/2018    CALCIUM 8.7 (L) 11/01/2018    ALBUMIN 3.7 11/01/2018    LABIL2 1.0 11/01/2018    AST 22 11/01/2018    ALT 18 11/01/2018    CHOL 182 02/13/2018    TRIG 40 02/13/2018    HDL 47 02/13/2018     (H) 02/13/2018     Lab Results   Component Value Date    HGBA1C 5.5 03/12/2018     Lab Results   Component Value Date    CREATININE 0.9 11/01/2018     No results found for: TSH, FREET4, THYROIDAB    ==========================================================================    ASSESSMENT AND PLAN    Assessment:  #Prediabetes  #Morbid obesity  #Metabolic syndrome  #Hyperlipidemia    Plan:  - Discussed with patient about diagnosis of prediabetes  - Repeat A1c before next visit  - Patient recently had repeat blood work done at [facility], will try to get results  - Otherwise patient will benefit from either Wegovy or Ozempic therapy, no underlying history of pancreatitis either personal or family history and no personal or family history of thyroid cancer  - Benefit and side effect of therapy discussed with patient in detail to which she verbalized understanding  - Referred patient to dietitian care as well  - We will start Ozempic/Wegovy therapy with up titration  - Counseled patient in detail about maintaining unreceptive therapy as Ozempic as a teratogenic effect to which patient verbalized understanding  - Patient to follow-up in my clinic back again in 3 months time    Thank you for courtesy of consultation.    Return to clinic: 3 months    Entire assessment and plan was discussed and counseled the patient in detail to which patient verbalized understanding and agreed with care.  Answered all queries and concerns.    This note was created  "using voice recognition software and is inherently subject to errors including those of syntax and \"sound-alike\" substitutions which may escape proofreading.  In such instances, original meaning may be extrapolated by contextual derivation.    Note: Portions of this note may have been copied from previous notes but documentation have been reviewed and edited as necessary to support clinical decision making for today's visit.    ==========================================================================    INFORMATION PROVIDED TO PATIENT    Patient Instructions   Please,    - Start Wegovy or Ozempic therapy:     START 0.25mg weekly x 4 weeks     Then 0.5mg weekly thereafter    - Repeat A1c before next visit    - Please make appointment for diabetic education or dietitian services.    Thank you for your visit today.    If you have any questions or concerns please feel free to reach out of the office.       ==========================================================================  Izaiah Patton MD  Department of Endocrine, Diabetes and Metabolism  [facility], [place]  ==========================================================================  "

## 2023-08-30 NOTE — PROGRESS NOTES
Specialty Pharmacy Patient Management Program  Endocrinology Introduction to Services Outreach     Lizette Jones is a 27 y.o. female seen by an Endocrinology provider for Prediabetes and Weight Management . This was an initial visit to introduce Endocrinology Patient Management Program and Specialty Pharmacy services offered by [facility], as the patient is taking specialty medication Ozempic.     Lizette Jones is undecided about filling specialty medication at [facility] and/or enrollment in the Endocrine Disorders Patient Management Program at this time and enrollment is therefore UNDER REVIEW. Patient remains eligible for services in the future if desired. Plan to follow-up at a future visit.    Results of Benefits Investigation  Wegovy and ozempic were both denied by the insurance. Patient's plan reject saying they do not cover any weight management medications. Discussed with provider and patient and will send prescription to [place] pharmacy for compounded semaglutide.     Relevant Past Medical History and Comorbidities  Past Medical History:   Diagnosis Date    Diabetes     Gestational diabetes     Migraine     Sleep apnea 5/2016     Social History     Socioeconomic History    Marital status: Single    Number of children: 1    Years of education: 14   Tobacco Use    Smoking status: Never    Smokeless tobacco: Never   Vaping Use    Vaping Use: Never used   Substance and Sexual Activity    Alcohol use: No    Drug use: No    Sexual activity: Yes     Birth control/protection: Pill, Birth control pill          Allergies  Patient has no known allergies.       Current Medication List    Current Outpatient Medications:     cetirizine (zyrTEC) 10 MG tablet, Take 1 tablet by mouth Daily., Disp: , Rfl:     desvenlafaxine (PRISTIQ) 100 MG 24 hr tablet, , Disp: , Rfl:     hydrOXYzine (ATARAX) 25 MG tablet, , Disp: , Rfl:     Melatonin 5 MG chewable tablet, Chew 1  tablet 2 (Two) Times a Day., Disp: , Rfl:     norethindrone-ethinyl estradiol-iron (MICROGESTIN FE1.5/30) 1.5-30 MG-MCG tablet, , Disp: , Rfl:     propranolol LA (INDERAL LA) 60 MG 24 hr capsule, , Disp: , Rfl:     Semaglutide,0.25 or 0.5MG/DOS, (OZEMPIC) 2 MG/1.5ML solution pen-injector, Inject 0.25 mg under the skin once weekly for 4 weeks, then increase to 0.5 mg weekly if able to tolerate. PRESCRIPTION ORDER FAXED TO [facility], Disp: 2 mL, Rfl: 2    topiramate (TOPAMAX) 100 MG tablet, , Disp: , Rfl:     ubrogepant (UBRELVY) 100 MG tablet, Take 1 tablet by mouth., Disp: , Rfl:       (semaglutide)  Medication Expectations   Why am I taking this medication? You are taking Ozempic, along with diet and exercise, to lower blood sugar because you have type 2 diabetes. Diabetes is not curable but with proper medication and treatment, we can keep your blood sugar within your personalized target range.    What should I expect while on this medication? You should expect to see your blood sugar, A1c and possibly weight decrease over time.   How does the medication work? Ozempic is a non-insulin injection that works with your body to release insulin in response to your blood sugar rising.  This medication also slows down food from leaving your stomach, making you feel bolden for longer.   How long will I be on this medication for? The amount of time you will be on this medication will be determined by your doctor based on blood sugar and A1c control. You will most likely be on this medication or another diabetes medication throughout your lifetime. Do not abruptly stop this medication without talking to your doctor first.    How do I take this medication? Take as directed on your prescription label. Ozempic is injected under the skin (subcutaneously) of your stomach, thigh or upper arm.  Use this medication once weekly, on the same day each week, and it can be given with or without food.    What  are some possible side effects? You may notice you don't feel as hungry, especially when you first start using Ozempic.  The most common side effects are nausea, stomach cramping, and constipation. Stop using Ozempic and call your doctor immediately if you have severe pain in your stomach area that will not go away.    What happens if I miss a dose? If you miss a dose, take it as soon as you remember as long as it is within 5 days after your missed dose.  If more than 5 days have passed, skip the missed dose and resume Ozempic on the regularly scheduled day.     Medication Safety   What are things I should warn my doctor immediately about? Do not use Ozempic if you or a family member have ever had medullary thyroid cancer (MTC) or Multiple Endocrine Neoplasia syndrome type 2 (MEN 2).  Tell your doctor if you have or have had problems with your kidneys or pancreas, or if you are pregnant, planning to become pregnant. Stop using Ozempic and get medical help right away if you have severe pain that will not go away, or if you notice any signs/symptoms of an allergic reaction (rash, hives, difficulty breathing, etc.).    What are things that I should be cautious of? Be cautious of any side effects from this medication. Talk to your doctor if any new ones develop or aren't getting better.    What are some medications that can interact with this one? Taking Ozempic with other medications that also lower your blood sugar such as insulin and glipizide/glimepiride/glyburide may increase the risk of low blood sugar. Your doctor may reduce the dose of these medications when you start Ozempic.  Always tell your doctor or pharmacist immediately if you start taking any new medications, including over-the-counter medications, vitamins, and herbal supplements.       Medication Storage/Handling   How should I handle this medication? Keep this medication out of reach of pets/children and keep the pen capped    How does this medication  need to be stored? Store in the refrigerator prior to first use, but do not freeze.  After first use, you may continue to store in the refrigerator or at room temperature for 56 days.  Protect from excessive heat and sunlight.   How should I dispose of this medication? Used Ozempic pens should be thrown away after 56 days, even if medication remains. If your doctor decides to stop this medication, take to your local police station for proper disposal. Some pharmacies also have take-back bins for medication drop-off.      Resources/Support   How can I remind myself to take this medication? You can download reminder apps to help you manage your refills. You may also set an alarm on your phone to remind you.    Is financial support available?  [employer] can provide co-pay cards if you have commercial insurance or patient assistance if you have Medicare or no insurance.    Which vaccines are recommended for me? Talk to your doctor about these vaccines: Flu, Coronavirus (COVID-19), Pneumococcal (pneumonia), Tdap, Hepatitis B, Zoster (shingles)          Changes to Therapy Plan Discussed with Patient  Medication Therapy Changes by Provider Patient's plan did not cover ozempic or wegovy. Discussed with provider and patient and sent order for compounded semaglutide to [place] [facility] (order is scanned into the media tab).   Patient was educated in clinic.   Additional Plans, Therapy Recommendations, or Therapy Problems to Be Addressed: none     Gita Miranda, PharmD  Clinical Specialty Pharmacist, Endocrinology  8/30/2023  16:28 EDT

## 2023-11-13 ENCOUNTER — LAB (OUTPATIENT)
Dept: LAB | Facility: HOSPITAL | Age: 28
End: 2023-11-13
Payer: MEDICAID

## 2023-11-13 DIAGNOSIS — E88.810 METABOLIC SYNDROME: ICD-10-CM

## 2023-11-13 DIAGNOSIS — E66.01 MORBID OBESITY: ICD-10-CM

## 2023-11-13 DIAGNOSIS — E78.2 MIXED HYPERLIPIDEMIA: ICD-10-CM

## 2023-11-13 DIAGNOSIS — R73.03 PREDIABETES: ICD-10-CM

## 2023-11-13 LAB — HBA1C MFR BLD: 5.5 % (ref 4.8–5.6)

## 2023-11-13 PROCEDURE — 83036 HEMOGLOBIN GLYCOSYLATED A1C: CPT

## 2023-11-13 PROCEDURE — 36415 COLL VENOUS BLD VENIPUNCTURE: CPT

## 2023-11-20 ENCOUNTER — TELEPHONE (OUTPATIENT)
Dept: ENDOCRINOLOGY | Facility: CLINIC | Age: 28
End: 2023-11-20
Payer: MEDICAID

## 2023-11-20 ENCOUNTER — OFFICE VISIT (OUTPATIENT)
Dept: ENDOCRINOLOGY | Facility: CLINIC | Age: 28
End: 2023-11-20
Payer: MEDICAID

## 2023-11-20 VITALS
BODY MASS INDEX: 42.43 KG/M2 | DIASTOLIC BLOOD PRESSURE: 68 MMHG | HEIGHT: 66 IN | OXYGEN SATURATION: 98 % | SYSTOLIC BLOOD PRESSURE: 108 MMHG | WEIGHT: 264 LBS | HEART RATE: 82 BPM

## 2023-11-20 DIAGNOSIS — E66.01 MORBID OBESITY: ICD-10-CM

## 2023-11-20 DIAGNOSIS — K59.03 DRUG-INDUCED CONSTIPATION: ICD-10-CM

## 2023-11-20 DIAGNOSIS — E88.810 METABOLIC SYNDROME: ICD-10-CM

## 2023-11-20 DIAGNOSIS — E78.2 MIXED HYPERLIPIDEMIA: ICD-10-CM

## 2023-11-20 DIAGNOSIS — R73.03 PREDIABETES: Primary | ICD-10-CM

## 2023-11-20 PROCEDURE — 1159F MED LIST DOCD IN RCRD: CPT | Performed by: INTERNAL MEDICINE

## 2023-11-20 PROCEDURE — 1160F RVW MEDS BY RX/DR IN RCRD: CPT | Performed by: INTERNAL MEDICINE

## 2023-11-20 PROCEDURE — 99214 OFFICE O/P EST MOD 30 MIN: CPT | Performed by: INTERNAL MEDICINE

## 2023-11-20 RX ORDER — RIZATRIPTAN BENZOATE 10 MG/1
1 TABLET ORAL ONCE AS NEEDED
COMMUNITY
Start: 2023-11-20 | End: 2024-11-19

## 2023-11-20 RX ORDER — DOCUSATE SODIUM 100 MG/1
100 CAPSULE, LIQUID FILLED ORAL 2 TIMES DAILY
Qty: 180 CAPSULE | Refills: 3 | Status: SHIPPED | OUTPATIENT
Start: 2023-11-20 | End: 2024-11-19

## 2023-11-20 RX ORDER — SEMAGLUTIDE 1.34 MG/ML
1 INJECTION, SOLUTION SUBCUTANEOUS WEEKLY
Qty: 3 ML | Refills: 4 | Status: SHIPPED | OUTPATIENT
Start: 2023-11-20

## 2023-11-20 RX ORDER — POLYETHYLENE GLYCOL 3350 17 G/17G
17 POWDER, FOR SOLUTION ORAL DAILY PRN
Qty: 100 PACKET | Refills: 5 | Status: SHIPPED | OUTPATIENT
Start: 2023-11-20

## 2023-11-20 RX ORDER — FLUTICASONE PROPIONATE 50 MCG
2 SPRAY, SUSPENSION (ML) NASAL DAILY
COMMUNITY
Start: 2023-10-18

## 2023-11-20 NOTE — TELEPHONE ENCOUNTER
Patient seen in clinic today. Per provider, increase ozempic to 1 mg weekly. Provider requested pharmacy assistance with order form for compounded semaglutide. Order form filled out and signed by provider and faxed to [facility]. Order is scanned into the media tab.  Called and discussed with patient and she is aware and had no questions or concerns at this time.    Gita Miranda, PharmD  Clinical Specialty Pharmacist, Endocrinology  11/20/2023  14:55 EST

## 2023-11-20 NOTE — PATIENT INSTRUCTIONS
Please,    - Continue semaglutide therapy to 1 mg once a week.  - Start taking Colace 100 mg 1 pill by mouth twice a day for constipation.  You can also take MiraLAX 1 packet as needed.  - After 3 months if you are tolerating medication without any side effect you can call the office and increase the dose of semaglutide to 2 mg once a week.    Follow-up in my clinic back again in 6 months time with repeat blood work.    Thank you for your visit today.    If you have any questions or concerns please feel free to reach out of the office.

## 2023-11-20 NOTE — PROGRESS NOTES
-----------------------------------------------------------------  ENDOCRINE CLINIC NOTE  -----------------------------------------------------------------        PATIENT NAME: Lizette Jones  PATIENT : 1995 AGE: 28 y.o.  MRN NUMBER: [id number]  PRIMARY CARE: Yamile Pritchett APRN    ==========================================================================    CHIEF COMPLAINT: Prediabetes  DATE OF SERVICE: 23    ==========================================================================    HPI / SUBJECTIVE    28 y.o. female  is seen in the clinic today for follow-up of prediabetes.  Last clinic visit 2023.  Diagnosed with prediabetes in 2023 with A1c of 5.9%.  Patient was first diagnosed with prediabetes during the pregnancy 3 years ago.  Patient last A1c on 2023 were 5.5%.  Family history significant for type 2 diabetes.  Patient is currently consuming 2 meals a day.  Also have 2 snacks a day.  Have a 3-year-old at home as well.  Patient have a physically active job.  Patient has been struggling with obesity throughout her entire life.  Patient used to have regular cycles prior to pregnancy.  Patient is currently maintained on oral contraceptive therapy.  Currently on semaglutide therapy, tolerating well but reports to have some constipation.  Patient typically used to have bowel movements few times a day but now is having bowel movement every other day.    ==========================================================================                                                PAST MEDICAL HISTORY    Past Medical History:   Diagnosis Date    Diabetes     Gestational diabetes     Migraine     Sleep apnea 2016       ==========================================================================    PAST SURGICAL HISTORY    Past Surgical History:   Procedure Laterality Date    GASTRIC SLEEVE LAPAROSCOPIC          ==========================================================================    FAMILY HISTORY    Family History   Problem Relation Age of Onset    Migraines Mother     Diabetes Maternal Aunt     Hypertension Maternal Aunt     Diabetes Maternal Grandmother     Hypertension Maternal Grandmother        ==========================================================================    SOCIAL HISTORY    Social History     Socioeconomic History    Marital status: Single    Number of children: 1    Years of education: 14   Tobacco Use    Smoking status: Never    Smokeless tobacco: Never   Vaping Use    Vaping Use: Never used   Substance and Sexual Activity    Alcohol use: No    Drug use: No    Sexual activity: Yes     Birth control/protection: Pill, Birth control pill       ==========================================================================    MEDICATIONS      Current Outpatient Medications:     cetirizine (zyrTEC) 10 MG tablet, Take 1 tablet by mouth Daily., Disp: , Rfl:     desvenlafaxine (PRISTIQ) 100 MG 24 hr tablet, , Disp: , Rfl:     fluticasone (FLONASE) 50 MCG/ACT nasal spray, 2 sprays into the nostril(s) as directed by provider Daily., Disp: , Rfl:     hydrOXYzine (ATARAX) 25 MG tablet, , Disp: , Rfl:     Melatonin 5 MG chewable tablet, Chew 1 tablet 2 (Two) Times a Day., Disp: , Rfl:     norethindrone-ethinyl estradiol-iron (MICROGESTIN FE1.5/30) 1.5-30 MG-MCG tablet, , Disp: , Rfl:     propranolol LA (INDERAL LA) 60 MG 24 hr capsule, , Disp: , Rfl:     rizatriptan (MAXALT) 10 MG tablet, Take 1 tablet by mouth 1 (One) Time As Needed., Disp: , Rfl:     Semaglutide,0.25 or 0.5MG/DOS, (OZEMPIC) 2 MG/1.5ML solution pen-injector, Inject 0.25 mg under the skin once weekly for 4 weeks, then increase to 0.5 mg weekly if able to tolerate. PRESCRIPTION ORDER FAXED TO [facility], Disp: 2 mL, Rfl: 2    topiramate (TOPAMAX) 100 MG tablet, , Disp: , Rfl:     docusate sodium (Colace) 100 MG  "capsule, Take 1 capsule by mouth 2 (Two) Times a Day., Disp: 180 capsule, Rfl: 3    polyethylene glycol (MiraLax) 17 g packet, Take 17 g by mouth Daily As Needed (Constipation)., Disp: 100 packet, Rfl: 5    ==========================================================================    ALLERGIES    No Known Allergies    ==========================================================================    OBJECTIVE    Vitals:    11/20/23 0923   BP: 108/68   Pulse: 82   SpO2: 98%     Body mass index is 42.61 kg/m².     General: Alert, cooperative, no acute distress  Thyroid:  no enlargement/tenderness/palpable nodules  Lungs: Clear to auscultation bilaterally, respirations unlabored  Heart: Regular rate and rhythm, S1 and S2 normal, no murmur, rub or gallop  Abdomen: Soft, NT, ND and Bowel sounds Positive  Extremities:  Extremities normal, atraumatic, no cyanosis or edema    ==========================================================================    LAB EVALUATION    Lab Results   Component Value Date    GLUCOSE 106 (H) 11/01/2018    BUN 5 (L) 11/01/2018    CREATININE 0.9 11/01/2018    BCR 5.6 11/01/2018    K 3.8 11/01/2018    CO2 26 11/01/2018    CALCIUM 8.7 (L) 11/01/2018    ALBUMIN 3.7 11/01/2018    LABIL2 1.0 11/01/2018    AST 22 11/01/2018    ALT 18 11/01/2018    CHOL 182 02/13/2018    TRIG 40 02/13/2018    HDL 47 02/13/2018     (H) 02/13/2018     Lab Results   Component Value Date    HGBA1C 5.50 11/13/2023    HGBA1C 5.5 03/12/2018     Lab Results   Component Value Date    CREATININE 0.9 11/01/2018     No results found for: \"TSH\", \"FREET4\", \"THYROIDAB\"    ==========================================================================    ASSESSMENT AND PLAN    #Prediabetes, improved  #Morbid obesity, on semaglutide therapy  #Metabolic syndrome  #Hyperlipidemia  #Constipation, started colace and miralax    Plan:  - We will increase the dose of semaglutide to 1 mg once a week  - For constipation we will start patient on " "Colace daily and MiraLAX as as needed  - Referral for dietitian already in the chart, patient is currently struggling from multiple social issues at home, will try to make appointment  - Repeat follow-up in 6 months time  - Patient can call back in 3 months time if tolerating will uptitrate the dose of Ozempic to 2 mg    Thank you for courtesy of consultation.    Return to clinic: 6 months    Entire assessment and plan was discussed and counseled the patient in detail to which patient verbalized understanding and agreed with care.  Answered all queries and concerns.    This note was created using voice recognition software and is inherently subject to errors including those of syntax and \"sound-alike\" substitutions which may escape proofreading.  In such instances, original meaning may be extrapolated by contextual derivation.    Note: Portions of this note may have been copied from previous notes but documentation have been reviewed and edited as necessary to support clinical decision making for today's visit.    ==========================================================================    INFORMATION PROVIDED TO PATIENT    Patient Instructions   Please,    - Continue semaglutide therapy to 1 mg once a week.  - Start taking Colace 100 mg 1 pill by mouth twice a day for constipation.  You can also take MiraLAX 1 packet as needed.  - After 3 months if you are tolerating medication without any side effect you can call the office and increase the dose of semaglutide to 2 mg once a week.    Follow-up in my clinic back again in 6 months time with repeat blood work.    Thank you for your visit today.    If you have any questions or concerns please feel free to reach out of the office.       ==========================================================================  Izaiah Patton MD  Department of Endocrine, Diabetes and Metabolism  [facility], " [place]  ==========================================================================

## 2024-04-05 ENCOUNTER — LAB (OUTPATIENT)
Dept: LAB | Facility: HOSPITAL | Age: 29
End: 2024-04-05
Payer: MEDICAID

## 2024-04-05 ENCOUNTER — OFFICE VISIT (OUTPATIENT)
Dept: ENDOCRINOLOGY | Facility: CLINIC | Age: 29
End: 2024-04-05
Payer: MEDICAID

## 2024-04-05 VITALS
SYSTOLIC BLOOD PRESSURE: 110 MMHG | DIASTOLIC BLOOD PRESSURE: 70 MMHG | OXYGEN SATURATION: 98 % | BODY MASS INDEX: 41.46 KG/M2 | WEIGHT: 258 LBS | HEIGHT: 66 IN | HEART RATE: 73 BPM

## 2024-04-05 DIAGNOSIS — E88.810 METABOLIC SYNDROME: ICD-10-CM

## 2024-04-05 DIAGNOSIS — E78.2 MIXED HYPERLIPIDEMIA: ICD-10-CM

## 2024-04-05 DIAGNOSIS — E66.01 MORBID OBESITY: ICD-10-CM

## 2024-04-05 DIAGNOSIS — R73.03 PREDIABETES: ICD-10-CM

## 2024-04-05 DIAGNOSIS — R73.03 PREDIABETES: Primary | ICD-10-CM

## 2024-04-05 LAB — HBA1C MFR BLD: 5.4 % (ref 4.8–5.6)

## 2024-04-05 PROCEDURE — 36415 COLL VENOUS BLD VENIPUNCTURE: CPT

## 2024-04-05 PROCEDURE — 83036 HEMOGLOBIN GLYCOSYLATED A1C: CPT

## 2024-04-05 NOTE — PATIENT INSTRUCTIONS
Please,    - If you are not getting any benefit from semaglutide therapy you can discontinue.  - Get your blood work done for evaluation of prediabetes.  - Please make appointment for bariatric clinic for weight management.    Depending on to your A1c results we can follow-up as needed.    Thank you for your visit today.    If you have any questions or concerns please feel free to reach out of the office.

## 2024-04-05 NOTE — PROGRESS NOTES
-----------------------------------------------------------------  ENDOCRINE CLINIC NOTE  -----------------------------------------------------------------        PATIENT NAME: Lizette Jones  PATIENT : 1995 AGE: 28 y.o.  MRN NUMBER: [id number]  PRIMARY CARE: Yamile Pritchett APRN    ==========================================================================    CHIEF COMPLAINT: Prediabetes  DATE OF SERVICE: 24    ==========================================================================    HPI / SUBJECTIVE    28 y.o. female  is seen in the clinic today for follow-up of prediabetes.  Diagnosed with prediabetes in 2023 with A1c of 5.9%.  Family significant for type 2 diabetes.  Patient continues to consume 2 meals a day along with 2 snacks a day.  Patient currently have 4-year-old care at home.  Patient is a physically active job.  Patient reports to have frequent urination and requested for repeat A1c.  Currently on semaglutide 1 mg compounded.    ==========================================================================                                                PAST MEDICAL HISTORY    Past Medical History:   Diagnosis Date    Diabetes     Gestational diabetes     Migraine     Sleep apnea 2016       ==========================================================================    PAST SURGICAL HISTORY    Past Surgical History:   Procedure Laterality Date    GASTRIC SLEEVE LAPAROSCOPIC         ==========================================================================    FAMILY HISTORY    Family History   Problem Relation Age of Onset    Migraines Mother     Diabetes Maternal Aunt     Hypertension Maternal Aunt     Diabetes Maternal Grandmother     Hypertension Maternal Grandmother        ==========================================================================    SOCIAL HISTORY    Social History     Socioeconomic History    Marital status: Single    Number of children: 1     Years of education: 14   Tobacco Use    Smoking status: Never    Smokeless tobacco: Never   Vaping Use    Vaping status: Never Used   Substance and Sexual Activity    Alcohol use: No    Drug use: No    Sexual activity: Yes     Birth control/protection: Pill, Birth control pill       ==========================================================================    MEDICATIONS      Current Outpatient Medications:     cetirizine (zyrTEC) 10 MG tablet, Take 1 tablet by mouth Daily., Disp: , Rfl:     desvenlafaxine (PRISTIQ) 100 MG 24 hr tablet, , Disp: , Rfl:     docusate sodium (Colace) 100 MG capsule, Take 1 capsule by mouth 2 (Two) Times a Day., Disp: 180 capsule, Rfl: 3    fluticasone (FLONASE) 50 MCG/ACT nasal spray, 2 sprays into the nostril(s) as directed by provider Daily., Disp: , Rfl:     hydrOXYzine (ATARAX) 25 MG tablet, , Disp: , Rfl:     Melatonin 5 MG chewable tablet, Chew 1 tablet 2 (Two) Times a Day., Disp: , Rfl:     norethindrone-ethinyl estradiol-iron (MICROGESTIN FE1.5/30) 1.5-30 MG-MCG tablet, , Disp: , Rfl:     polyethylene glycol (MiraLax) 17 g packet, Take 17 g by mouth Daily As Needed (Constipation)., Disp: 100 packet, Rfl: 5    propranolol LA (INDERAL LA) 60 MG 24 hr capsule, , Disp: , Rfl:     rizatriptan (MAXALT) 10 MG tablet, Take 1 tablet by mouth 1 (One) Time As Needed., Disp: , Rfl:     topiramate (TOPAMAX) 100 MG tablet, , Disp: , Rfl:     ==========================================================================    ALLERGIES    No Known Allergies    ==========================================================================    OBJECTIVE    Vitals:    04/05/24 1356   BP: 110/70   Pulse: 73   SpO2: 98%       Body mass index is 41.64 kg/m².     General: Alert, cooperative, no acute distress    ==========================================================================    LAB EVALUATION    Lab Results   Component Value Date    GLUCOSE 106 (H) 11/01/2018    BUN 5 (L) 11/01/2018    CREATININE 0.9  "11/01/2018    BCR 5.6 11/01/2018    K 3.8 11/01/2018    CO2 26 11/01/2018    CALCIUM 8.7 (L) 11/01/2018    ALBUMIN 3.7 11/01/2018    LABIL2 1.0 11/01/2018    AST 22 11/01/2018    ALT 18 11/01/2018    CHOL 182 02/13/2018    TRIG 40 02/13/2018    HDL 47 02/13/2018     (H) 02/13/2018     Lab Results   Component Value Date    HGBA1C 5.50 11/13/2023    HGBA1C 5.5 03/12/2018     Lab Results   Component Value Date    CREATININE 0.9 11/01/2018     No results found for: \"TSH\", \"FREET4\", \"THYROIDAB\"    ==========================================================================    ASSESSMENT AND PLAN    #Prediabetes, last A1c of 5.5% on 11/13/2023  - Patient reports to be feeling excessive urination  - Given previous history of prediabetes will recheck A1c today    #Morbid obesity, on semaglutide therapy  #Metabolic syndrome  #Hyperlipidemia  - Patient is currently taking semaglutide compounded 1 mg once a week but reports no significant change  - Will discontinue semaglutide therapy for the patient  - Will refer patient to bariatric clinic for further evaluation    Thank you for courtesy of consultation.    Return to clinic: as needed    Entire assessment and plan was discussed and counseled the patient in detail to which patient verbalized understanding and agreed with care.  Answered all queries and concerns.    This note was created using voice recognition software and is inherently subject to errors including those of syntax and \"sound-alike\" substitutions which may escape proofreading.  In such instances, original meaning may be extrapolated by contextual derivation.    Note: Portions of this note may have been copied from previous notes but documentation have been reviewed and edited as necessary to support clinical decision making for today's visit.    ==========================================================================    INFORMATION PROVIDED TO PATIENT    Patient Instructions   Please,    - If you are not " getting any benefit from semaglutide therapy you can discontinue.  - Get your blood work done for evaluation of prediabetes.  - Please make appointment for bariatric clinic for weight management.    Depending on to your A1c results we can follow-up as needed.    Thank you for your visit today.    If you have any questions or concerns please feel free to reach out of the office.       ==========================================================================  Izaiah Patton MD  Department of Endocrine, Diabetes and Metabolism  [place], [place]  ==========================================================================

## 2024-04-09 ENCOUNTER — TELEPHONE (OUTPATIENT)
Dept: BARIATRICS/WEIGHT MGMT | Facility: CLINIC | Age: 29
End: 2024-04-09
Payer: MEDICAID

## 2024-04-23 ENCOUNTER — TELEPHONE (OUTPATIENT)
Dept: BARIATRICS/WEIGHT MGMT | Facility: CLINIC | Age: 29
End: 2024-04-23
Payer: MEDICAID

## 2024-04-23 NOTE — TELEPHONE ENCOUNTER
Left vm for pt to call office    Pt has bariatric referral///previous pt of Dr Trinh///DOS 11.2018   Quinolones Pregnancy And Lactation Text: This medication is Pregnancy Category C and it isn't know if it is safe during pregnancy. It is also excreted in breast milk.

## 2024-05-02 ENCOUNTER — TELEPHONE (OUTPATIENT)
Dept: ENDOCRINOLOGY | Facility: CLINIC | Age: 29
End: 2024-05-02
Payer: MEDICAID

## 2025-05-09 ENCOUNTER — OFFICE VISIT (OUTPATIENT)
Dept: ENDOCRINOLOGY | Facility: CLINIC | Age: 30
End: 2025-05-09
Payer: MEDICAID

## 2025-05-09 VITALS
OXYGEN SATURATION: 98 % | HEART RATE: 98 BPM | DIASTOLIC BLOOD PRESSURE: 62 MMHG | SYSTOLIC BLOOD PRESSURE: 118 MMHG | WEIGHT: 272.4 LBS | HEIGHT: 66 IN | BODY MASS INDEX: 43.78 KG/M2

## 2025-05-09 DIAGNOSIS — Z34.91 FIRST TRIMESTER PREGNANCY: ICD-10-CM

## 2025-05-09 DIAGNOSIS — E66.01 MORBID OBESITY: ICD-10-CM

## 2025-05-09 DIAGNOSIS — O24.419 GESTATIONAL DIABETES MELLITUS (GDM) IN FIRST TRIMESTER, GESTATIONAL DIABETES METHOD OF CONTROL UNSPECIFIED: Primary | ICD-10-CM

## 2025-05-09 RX ORDER — BLOOD-GLUCOSE METER
1 KIT MISCELLANEOUS
Qty: 1 EACH | Refills: 0 | Status: SHIPPED | OUTPATIENT
Start: 2025-05-09

## 2025-05-09 RX ORDER — AVOBENZONE, HOMOSALATE, OCTISALATE, OCTOCRYLENE 30; 40; 45; 26 MG/ML; MG/ML; MG/ML; MG/ML
1 CREAM TOPICAL
Qty: 200 EACH | Refills: 5 | Status: SHIPPED | OUTPATIENT
Start: 2025-05-09

## 2025-05-09 NOTE — PATIENT INSTRUCTIONS
Please,    - Start checking blood sugar 4 times a day: fasting and 2 hours after breakfast, lunch and dinner.  2-hour window starts once you start eating.  - Please consume high-protein low carbohydrate 3 meals a day along with a bedtime snack.  - If your blood sugars are significantly elevated we might have to use insulin therapy.  - Please maintain the log of these blood sugars and sent to the office every Monday morning for us to review and plan therapy accordingly.  - Please maintain good hydration  - Maintain good physical activity for 20 to 30 minutes a day.  - Please attend diabetic education class to learn about high-protein low carbohydrate diet and different diet choices.    Follow-up in clinic back again in 4 weeks time.    Thank you for your visit today.    If you have any questions or concerns please feel free to reach out of the office.

## 2025-05-09 NOTE — PROGRESS NOTES
-----------------------------------------------------------------  ENDOCRINE CLINIC NOTE  -----------------------------------------------------------------        PATIENT NAME: Lizette Jones  PATIENT : 1995 AGE: 29 y.o.  MRN NUMBER: [id number]  PRIMARY CARE: Yamile Pritchett APRN    ==========================================================================    CHIEF COMPLAINT: Gestational diabetes mellitus  DATE OF SERVICE: 25    ==========================================================================    HPI / SUBJECTIVE    29 y.o. female  is seen in the clinic today for GDM.  Patient is currently 11 Weeks and 1 Day pregnant with Estimated Date of Delivery: 2025.  Diagnosed with GDM with OGTT on 2025.    - Previous Hx of GDM: None but patient was glucose intolerant during the first pregnancy as well  - Family Hx of Diabetes: Yes  - Checking BG at home: None  - Diet: Two meals a day, three snacks a day  - Exercise: Walking 15 mins a day    ==========================================================================                                                PAST MEDICAL HISTORY    Past Medical History:   Diagnosis Date    Diabetes     Gestational diabetes     Migraine     Sleep apnea 2016       ==========================================================================    PAST SURGICAL HISTORY    Past Surgical History:   Procedure Laterality Date    GASTRIC SLEEVE LAPAROSCOPIC         ==========================================================================    FAMILY HISTORY    Family History   Problem Relation Age of Onset    Migraines Mother     Diabetes Maternal Aunt     Hypertension Maternal Aunt     Diabetes Maternal Grandmother     Hypertension Maternal Grandmother        ==========================================================================    SOCIAL HISTORY    Social History     Socioeconomic History    Marital status: Single    Number of children: 1     Years of education: 14   Tobacco Use    Smoking status: Never    Smokeless tobacco: Never   Vaping Use    Vaping status: Never Used   Substance and Sexual Activity    Alcohol use: No    Drug use: No    Sexual activity: Yes     Birth control/protection: Pill, Birth control pill       ==========================================================================    MEDICATIONS      Current Outpatient Medications:     cetirizine (zyrTEC) 10 MG tablet, Take 1 tablet by mouth Daily., Disp: , Rfl:     desvenlafaxine (PRISTIQ) 100 MG 24 hr tablet, , Disp: , Rfl:     fluticasone (FLONASE) 50 MCG/ACT nasal spray, Administer 2 sprays into the nostril(s) as directed by provider Daily., Disp: , Rfl:     hydrOXYzine (ATARAX) 25 MG tablet, , Disp: , Rfl:     Melatonin 5 MG chewable tablet, Chew 1 tablet 2 (Two) Times a Day., Disp: , Rfl:     norethindrone-ethinyl estradiol-iron (MICROGESTIN FE1.5/30) 1.5-30 MG-MCG tablet, , Disp: , Rfl:     polyethylene glycol (MiraLax) 17 g packet, Take 17 g by mouth Daily As Needed (Constipation)., Disp: 100 packet, Rfl: 5    propranolol LA (INDERAL LA) 60 MG 24 hr capsule, , Disp: , Rfl:     topiramate (TOPAMAX) 100 MG tablet, , Disp: , Rfl:     rizatriptan (MAXALT) 10 MG tablet, Take 1 tablet by mouth 1 (One) Time As Needed., Disp: , Rfl:     ==========================================================================    ALLERGIES    No Known Allergies    ==========================================================================    OBJECTIVE    Vitals:    05/09/25 1256   BP: 118/62   Pulse: 98   SpO2: 98%     Body mass index is 43.97 kg/m².     General: Alert, cooperative, no acute distress  Thyroid:  no enlargement/tenderness/palpable nodules  Lungs: Clear to auscultation bilaterally, respirations unlabored  Heart: Regular rate and rhythm, S1 and S2 normal, no murmur, rub or gallop  Abdomen: Soft, NT, ND and Bowel sounds Positive  Extremities:  Extremities normal, atraumatic, no cyanosis or  "edema    ==========================================================================    LAB EVALUATION    Lab Results   Component Value Date    GLUCOSE 106 (H) 11/01/2018    BUN 5 (L) 11/01/2018    CREATININE 0.9 11/01/2018    BCR 5.6 11/01/2018    K 3.8 11/01/2018    CO2 26 11/01/2018    CALCIUM 8.7 (L) 11/01/2018    ALBUMIN 3.7 11/01/2018    AST 22 11/01/2018    ALT 18 11/01/2018    CHOL 182 02/13/2018    TRIG 40 02/13/2018    HDL 47 02/13/2018     (H) 02/13/2018     Lab Results   Component Value Date    HGBA1C 5.40 04/05/2024    HGBA1C 5.50 11/13/2023    HGBA1C 5.5 03/12/2018     Lab Results   Component Value Date    CREATININE 0.9 11/01/2018     No results found for: \"TSH\", \"FREET4\", \"THYROIDAB\"    ==========================================================================    ASSESSMENT AND PLAN    # Gestational Diabetes Mellitus  # 1st Trimester of Pregnancy  # Morbid obesity with BMI 43.97    - Discussed with patient in detail about diagnosis of GDM  - Counseled patient to start checking blood sugar 4 times a day: Fasting and 2-hour postprandial  - Patient to maintain log of these blood sugar and sent to the office every Monday morning for us to review blood sugar and plan therapy accordingly  - Patient to consume high-protein low carbohydrate diet and avoid missing meals, patient verbalized understanding  - Counseled patient about maintaining physical activity almost 20 to 30 minutes every day to visualize understanding  - Log sheets provided to the patient  - Patient will follow-up in the clinic back again in 4 weeks time  - Referral placed for diabetes education for GDM    Thank you for courtesy of consultation.    Return to clinic: 4 weeks    Entire assessment and plan was discussed and counseled the patient in detail to which patient verbalized understanding and agreed with care.  Answered all queries and concerns.    Part of this note may be an electronic transcription/translation of spoken language " to printed text using the Dragon Dictation System.     Note: Portions of this note may have been copied from previous notes but documentation have been reviewed and edited as necessary to support clinical decision making for today's visit.    ==========================================================================    INFORMATION PROVIDED TO PATIENT    Patient Instructions   Please,    - Start checking blood sugar 4 times a day: fasting and 2 hours after breakfast, lunch and dinner.  2-hour window starts once you start eating.  - Please consume high-protein low carbohydrate 3 meals a day along with a bedtime snack.  - If your blood sugars are significantly elevated we might have to use insulin therapy.  - Please maintain the log of these blood sugars and sent to the office every Monday morning for us to review and plan therapy accordingly.  - Please maintain good hydration  - Maintain good physical activity for 20 to 30 minutes a day.  - Please attend diabetic education class to learn about high-protein low carbohydrate diet and different diet choices.    Follow-up in clinic back again in 4 weeks time.    Thank you for your visit today.    If you have any questions or concerns please feel free to reach out of the office.       ==========================================================================  Izaiah Patton MD  Department of Endocrine, Diabetes and Metabolism  [facility], [place]  ==========================================================================

## 2025-05-13 RX ORDER — BLOOD SUGAR DIAGNOSTIC
STRIP MISCELLANEOUS
Qty: 150 EACH | Refills: 5 | Status: SHIPPED | OUTPATIENT
Start: 2025-05-13

## 2025-05-13 RX ORDER — LANCETS 33 GAUGE
EACH MISCELLANEOUS
Qty: 150 EACH | Refills: 5 | Status: SHIPPED | OUTPATIENT
Start: 2025-05-13

## 2025-05-13 RX ORDER — BLOOD-GLUCOSE METER
1 EACH MISCELLANEOUS 4 TIMES DAILY
Qty: 1 KIT | Refills: 0 | Status: SHIPPED | OUTPATIENT
Start: 2025-05-13 | End: 2025-05-15 | Stop reason: SDUPTHER

## 2025-05-15 RX ORDER — BLOOD-GLUCOSE METER
1 EACH MISCELLANEOUS 4 TIMES DAILY
Qty: 1 KIT | Refills: 0 | Status: SHIPPED | OUTPATIENT
Start: 2025-05-15

## 2025-05-15 NOTE — TELEPHONE ENCOUNTER
Caller: Lizette Jones    Relationship: Self    Best call back number: [phone]    Requested Prescriptions:   Requested Prescriptions     Pending Prescriptions Disp Refills    Blood Glucose Monitoring Suppl (OneTouch Verio Flex System) w/Device kit 1 kit 0     Sig: Use 1 each 4 (Four) Times a Day. Use 1 each 4 (Four) Times a Day Before Meals & at Bedtime.        Pharmacy where request should be sent: [facility] [id number] [address]/[facility] [phone] [facility] [phone]      Last office visit with prescribing clinician: 5/9/2025   Last telemedicine visit with prescribing clinician: Visit date not found   Next office visit with prescribing clinician: 6/24/2025     Additional details provided by patient:     Does the patient have less than a 3 day supply:  [] Yes  [] No    Would you like a call back once the refill request has been completed: [x] Yes [] No    If the office needs to give you a call back, can they leave a voicemail: [] Yes [] No    Betty Johnson Rep   05/15/25 09:49 EDT

## 2025-05-20 ENCOUNTER — TELEPHONE (OUTPATIENT)
Dept: ENDOCRINOLOGY | Facility: CLINIC | Age: 30
End: 2025-05-20
Payer: MEDICAID

## 2025-05-21 NOTE — TELEPHONE ENCOUNTER
Logs reviewed but there is no significant blood sugar available till 5/17/2025.  Counseled patient to maintain blood sugar monitoring and send the blood sugar logs next Monday for us to review and plan adjustment of therapy accordingly.

## 2025-06-11 ENCOUNTER — PATIENT OUTREACH (OUTPATIENT)
Dept: LABOR AND DELIVERY | Facility: HOSPITAL | Age: 30
End: 2025-06-11
Payer: MEDICAID

## 2025-06-11 ENCOUNTER — REFERRAL TRIAGE (OUTPATIENT)
Dept: LABOR AND DELIVERY | Facility: HOSPITAL | Age: 30
End: 2025-06-11
Payer: MEDICAID

## 2025-06-11 NOTE — OUTREACH NOTE
[facility]  Enrollment    Current Estimated Gestational Age: 15w5d    Questions/Answers      Flowsheet Row Responses   Would like to participate? --  [welcome sent]          Shelli Rodriguez RN  Maternity Nurse Navigator    6/11/2025, 19:49 EDT

## 2025-06-23 ENCOUNTER — PATIENT OUTREACH (OUTPATIENT)
Dept: LABOR AND DELIVERY | Facility: HOSPITAL | Age: 30
End: 2025-06-23
Payer: MEDICAID

## 2025-06-23 NOTE — OUTREACH NOTE
[facility]  Unable to Reach    Questions/Answers      Flowsheet Row Responses   Pending Outreach Confirm Patient Interest   Call Attempt Second   Outcome Left message, [email or web address] message sent to patient              Shelli Rodriguez RN  Maternity Nurse Navigator    6/23/2025, 15:51 EDT

## 2025-06-24 ENCOUNTER — OFFICE VISIT (OUTPATIENT)
Dept: ENDOCRINOLOGY | Facility: CLINIC | Age: 30
End: 2025-06-24
Payer: MEDICAID

## 2025-06-24 ENCOUNTER — PATIENT OUTREACH (OUTPATIENT)
Dept: LABOR AND DELIVERY | Facility: HOSPITAL | Age: 30
End: 2025-06-24
Payer: MEDICAID

## 2025-06-24 VITALS
OXYGEN SATURATION: 98 % | HEIGHT: 66 IN | BODY MASS INDEX: 44.36 KG/M2 | WEIGHT: 276 LBS | HEART RATE: 95 BPM | SYSTOLIC BLOOD PRESSURE: 112 MMHG | DIASTOLIC BLOOD PRESSURE: 68 MMHG

## 2025-06-24 DIAGNOSIS — O24.419 GESTATIONAL DIABETES MELLITUS (GDM) IN SECOND TRIMESTER, GESTATIONAL DIABETES METHOD OF CONTROL UNSPECIFIED: Primary | ICD-10-CM

## 2025-06-24 DIAGNOSIS — E66.01 MORBID OBESITY: ICD-10-CM

## 2025-06-24 DIAGNOSIS — Z34.92 SECOND TRIMESTER PREGNANCY: ICD-10-CM

## 2025-06-24 PROCEDURE — 1159F MED LIST DOCD IN RCRD: CPT | Performed by: INTERNAL MEDICINE

## 2025-06-24 PROCEDURE — 1160F RVW MEDS BY RX/DR IN RCRD: CPT | Performed by: INTERNAL MEDICINE

## 2025-06-24 PROCEDURE — 99213 OFFICE O/P EST LOW 20 MIN: CPT | Performed by: INTERNAL MEDICINE

## 2025-06-24 NOTE — PATIENT INSTRUCTIONS
Please,    - Restart checking blood sugar 4 times a day: fasting and 2 hours after breakfast, lunch and dinner.  2-hour window starts once you start eating.  With the samples provided today you can start checking your blood sugars but you still have to maintain the log of the blood sugars in the specified timings because through the computer generated report I will be able to identify the fasting and 2-hour postmeal sugars.  - Please consume high-protein low carbohydrate 3 meals a day along with a bedtime snack.  - Please maintain the log of these blood sugars and sent to the office every Monday morning for us to review and plan therapy accordingly.  - Please maintain good hydration  - Maintain good physical activity for 20 to 30 minutes a day.    Follow-up in clinic back again in 2 weeks time.    Thank you for your visit today.    If you have any questions or concerns please feel free to reach out of the office.

## 2025-06-24 NOTE — OUTREACH NOTE
[facility]  Unable to Reach    Questions/Answers      Flowsheet Row Responses   Pending Outreach Confirm Patient Interest   Call Attempt Third   Outcome Left message, MyChart message sent to patient                Shelli Rodriguez RN  Maternity Nurse Navigator    6/24/2025, 15:25 EDT

## 2025-06-24 NOTE — PROGRESS NOTES
-----------------------------------------------------------------  ENDOCRINE CLINIC NOTE  -----------------------------------------------------------------        PATIENT NAME: Lizette Jones  PATIENT : 1995 AGE: 29 y.o.  MRN NUMBER: [id number]  PRIMARY CARE: Yamile Pritchett APRN    ==========================================================================    CHIEF COMPLAINT: Gestational diabetes mellitus  DATE OF SERVICE: 25    ==========================================================================    HPI / SUBJECTIVE    29 y.o. female  is seen in the clinic today for GDM.  Patient is currently 17 Weeks and 4 Day pregnant with Estimated Date of Delivery: 2025.  Diagnosed with GDM with OGTT on 2025.  Not checking BG at home for last 2 weeks.  Two meals a day and snacking in between.    ==========================================================================                                                PAST MEDICAL HISTORY    Past Medical History:   Diagnosis Date    Diabetes     Gestational diabetes     Migraine     Sleep apnea 2016       ==========================================================================    PAST SURGICAL HISTORY    Past Surgical History:   Procedure Laterality Date    GASTRIC SLEEVE LAPAROSCOPIC         ==========================================================================    FAMILY HISTORY    Family History   Problem Relation Age of Onset    Migraines Mother     Diabetes Maternal Aunt     Hypertension Maternal Aunt     Diabetes Maternal Grandmother     Hypertension Maternal Grandmother        ==========================================================================    SOCIAL HISTORY    Social History     Socioeconomic History    Marital status: Single    Number of children: 1    Years of education: 14   Tobacco Use    Smoking status: Never     Passive exposure: Never    Smokeless tobacco: Never   Vaping Use    Vaping status: Never  Used   Substance and Sexual Activity    Alcohol use: No    Drug use: No    Sexual activity: Yes     Birth control/protection: Pill, Birth control pill       ==========================================================================    MEDICATIONS      Current Outpatient Medications:     Blood Glucose Monitoring Suppl (OneTouch Verio Flex System) w/Device kit, Use 1 each 4 (Four) Times a Day. Use 1 each 4 (Four) Times a Day Before Meals & at Bedtime., Disp: 1 kit, Rfl: 0    cetirizine (zyrTEC) 10 MG tablet, Take 1 tablet by mouth Daily., Disp: , Rfl:     desvenlafaxine (PRISTIQ) 100 MG 24 hr tablet, , Disp: , Rfl:     fluticasone (FLONASE) 50 MCG/ACT nasal spray, Administer 2 sprays into the nostril(s) as directed by provider Daily., Disp: , Rfl:     glucose blood (OneTouch Verio) test strip, Use 1 each 4 (Four) Times a Day Before Meals & at Bedtime., Disp: 150 each, Rfl: 5    glucose blood test strip, 1 each by Other route 4 (Four) Times a Day Before Meals & at Bedtime., Disp: 400 each, Rfl: 5    glucose monitor monitoring kit, Use 1 each 4 (Four) Times a Day Before Meals & at Bedtime., Disp: 1 each, Rfl: 0    hydrOXYzine (ATARAX) 25 MG tablet, , Disp: , Rfl:     Lancets misc, Use 1 each 4 (Four) Times a Day Before Meals & at Bedtime., Disp: 200 each, Rfl: 5    Melatonin 5 MG chewable tablet, Chew 1 tablet 2 (Two) Times a Day., Disp: , Rfl:     norethindrone-ethinyl estradiol-iron (MICROGESTIN FE1.5/30) 1.5-30 MG-MCG tablet, , Disp: , Rfl:     OneTouch Delica Lancets 33G misc, Use 1 each 4 (Four) Times a Day Before Meals & at Bedtime., Disp: 150 each, Rfl: 5    polyethylene glycol (MiraLax) 17 g packet, Take 17 g by mouth Daily As Needed (Constipation)., Disp: 100 packet, Rfl: 5    propranolol LA (INDERAL LA) 60 MG 24 hr capsule, , Disp: , Rfl:     rizatriptan (MAXALT) 10 MG tablet, Take 1 tablet by mouth 1 (One) Time As Needed., Disp: , Rfl:     topiramate (TOPAMAX) 100 MG tablet, , Disp: , Rfl:  "    ==========================================================================    ALLERGIES    No Known Allergies    ==========================================================================    OBJECTIVE    Vitals:    06/24/25 0900   BP: 112/68   Pulse: 95   SpO2: 98%     Body mass index is 44.55 kg/m².     General: Alert, cooperative, no acute distress  Thyroid:  no enlargement/tenderness/palpable nodules  Lungs: Clear to auscultation bilaterally, respirations unlabored  Heart: Regular rate and rhythm, S1 and S2 normal, no murmur, rub or gallop  Abdomen: Soft, NT, ND and Bowel sounds Positive  Extremities:  Extremities normal, atraumatic, no cyanosis or edema    ==========================================================================    LAB EVALUATION    Lab Results   Component Value Date    GLUCOSE 106 (H) 11/01/2018    BUN 5 (L) 11/01/2018    CREATININE 0.9 11/01/2018    BCR 5.6 11/01/2018    K 3.8 11/01/2018    CO2 26 11/01/2018    CALCIUM 8.7 (L) 11/01/2018    ALBUMIN 3.7 11/01/2018    AST 22 11/01/2018    ALT 18 11/01/2018    CHOL 182 02/13/2018    TRIG 40 02/13/2018    HDL 47 02/13/2018     (H) 02/13/2018     Lab Results   Component Value Date    HGBA1C 5.40 04/05/2024    HGBA1C 5.50 11/13/2023    HGBA1C 5.5 03/12/2018     Lab Results   Component Value Date    CREATININE 0.9 11/01/2018     No results found for: \"TSH\", \"FREET4\", \"THYROIDAB\"    ==========================================================================    ASSESSMENT AND PLAN    # Gestational Diabetes Mellitus  # 2st Trimester of Pregnancy  # Morbid obesity with BMI 44.55    - Counseled patient about importance of checking blood sugars and having proper 3 meals a day with a bedtime snack  - Patient will start checking blood sugar again fasting and 2-hour postprandial  - Blood sugar to be sent to the office on Monday mornings for us to review and plan therapy accordingly  - On the review of the previous blood sugar there is a potential " that patient may require insulin therapy  - New log sheets provided  - Clinical follow-up in 2 weeks  - For the ease of patient provided 2 samples of freestyle tommy 3+    Return to clinic: 2 weeks    Entire assessment and plan was discussed and counseled the patient in detail to which patient verbalized understanding and agreed with care.  Answered all queries and concerns.    Part of this note may be an electronic transcription/translation of spoken language to printed text using the Dragon Dictation System.     Note: Portions of this note may have been copied from previous notes but documentation have been reviewed and edited as necessary to support clinical decision making for today's visit.    ==========================================================================    INFORMATION PROVIDED TO PATIENT    Patient Instructions   Please,    - Restart checking blood sugar 4 times a day: fasting and 2 hours after breakfast, lunch and dinner.  2-hour window starts once you start eating.  With the samples provided today you can start checking your blood sugars but you still have to maintain the log of the blood sugars in the specified timings because through the computer generated report I will be able to identify the fasting and 2-hour postmeal sugars.  - Please consume high-protein low carbohydrate 3 meals a day along with a bedtime snack.  - Please maintain the log of these blood sugars and sent to the office every Monday morning for us to review and plan therapy accordingly.  - Please maintain good hydration  - Maintain good physical activity for 20 to 30 minutes a day.    Follow-up in clinic back again in 2 weeks time.    Thank you for your visit today.    If you have any questions or concerns please feel free to reach out of the office.       ==========================================================================  Izaiah Patton MD  Department of Endocrine, Diabetes and Metabolism  [facility]  [place], [place]  ==========================================================================

## 2025-06-30 ENCOUNTER — TELEPHONE (OUTPATIENT)
Dept: ENDOCRINOLOGY | Facility: CLINIC | Age: 30
End: 2025-06-30
Payer: MEDICAID

## 2025-06-30 NOTE — TELEPHONE ENCOUNTER
Caller: TRISHA MORALEZ    Relationship:SELF    Callback number: [phone]      Is it ok to leave a message: [x] Yes [] No    Requested medication for samples: TRANG 3 SENSORS    How much medication does the patient currently have left: 0    Who will be picking up the samples: SELF    Do you need information about patient financial assistance for this medication: [] Yes [x] No

## 2025-07-02 ENCOUNTER — TELEPHONE (OUTPATIENT)
Dept: ENDOCRINOLOGY | Facility: CLINIC | Age: 30
End: 2025-07-02

## 2025-07-07 ENCOUNTER — TELEPHONE (OUTPATIENT)
Dept: ENDOCRINOLOGY | Facility: CLINIC | Age: 30
End: 2025-07-07

## 2025-07-08 ENCOUNTER — OFFICE VISIT (OUTPATIENT)
Dept: ENDOCRINOLOGY | Facility: CLINIC | Age: 30
End: 2025-07-08
Payer: MEDICAID

## 2025-07-08 VITALS
SYSTOLIC BLOOD PRESSURE: 118 MMHG | DIASTOLIC BLOOD PRESSURE: 80 MMHG | WEIGHT: 279 LBS | HEART RATE: 104 BPM | HEIGHT: 66 IN | OXYGEN SATURATION: 98 % | BODY MASS INDEX: 44.84 KG/M2

## 2025-07-08 DIAGNOSIS — O24.414 INSULIN CONTROLLED GESTATIONAL DIABETES MELLITUS (GDM) IN SECOND TRIMESTER: Primary | ICD-10-CM

## 2025-07-08 DIAGNOSIS — Z34.92 SECOND TRIMESTER PREGNANCY: ICD-10-CM

## 2025-07-08 DIAGNOSIS — E66.01 MORBID OBESITY: ICD-10-CM

## 2025-07-08 RX ORDER — INSULIN LISPRO 100 [IU]/ML
2 INJECTION, SOLUTION INTRAVENOUS; SUBCUTANEOUS
Qty: 15 ML | Refills: 1 | Status: SHIPPED | OUTPATIENT
Start: 2025-07-08

## 2025-07-08 RX ORDER — PEN NEEDLE, DIABETIC 30 GX3/16"
1 NEEDLE, DISPOSABLE MISCELLANEOUS
Qty: 400 EACH | Refills: 1 | Status: SHIPPED | OUTPATIENT
Start: 2025-07-08

## 2025-07-08 RX ORDER — HYDROCHLOROTHIAZIDE 12.5 MG/1
CAPSULE ORAL
Qty: 6 EACH | Refills: 3 | Status: SHIPPED | OUTPATIENT
Start: 2025-07-08

## 2025-07-08 NOTE — PROGRESS NOTES
-----------------------------------------------------------------  ENDOCRINE CLINIC NOTE  -----------------------------------------------------------------        PATIENT NAME: Lizette Jones  PATIENT : 1995 AGE: 29 y.o.  MRN NUMBER: [id number]  PRIMARY CARE: Yamile Pritchett APRN    ==========================================================================    CHIEF COMPLAINT: Gestational diabetes mellitus  DATE OF SERVICE: 25    ==========================================================================    HPI / SUBJECTIVE    29 y.o. female  is seen in the clinic today for GDM.  Patient is currently 19 Weeks and 4 Day pregnant with Estimated Date of Delivery: 2025.  Diagnosed with GDM with OGTT on 2025.  Checking BG three times a day  Three meals a day and bedtime snacks.    ==========================================================================                                                PAST MEDICAL HISTORY    Past Medical History:   Diagnosis Date    Diabetes     Gestational diabetes     Migraine     Sleep apnea 2016       ==========================================================================    PAST SURGICAL HISTORY    Past Surgical History:   Procedure Laterality Date    GASTRIC SLEEVE LAPAROSCOPIC         ==========================================================================    FAMILY HISTORY    Family History   Problem Relation Age of Onset    Migraines Mother     Diabetes Maternal Aunt     Hypertension Maternal Aunt     Diabetes Maternal Grandmother     Hypertension Maternal Grandmother        ==========================================================================    SOCIAL HISTORY    Social History     Socioeconomic History    Marital status: Single    Number of children: 1    Years of education: 14   Tobacco Use    Smoking status: Never     Passive exposure: Never    Smokeless tobacco: Never   Vaping Use    Vaping status: Never Used    Substance and Sexual Activity    Alcohol use: No    Drug use: No    Sexual activity: Yes     Birth control/protection: Pill, Birth control pill       ==========================================================================    MEDICATIONS      Current Outpatient Medications:     Blood Glucose Monitoring Suppl (OneTouch Verio Flex System) w/Device kit, Use 1 each 4 (Four) Times a Day. Use 1 each 4 (Four) Times a Day Before Meals & at Bedtime., Disp: 1 kit, Rfl: 0    cetirizine (zyrTEC) 10 MG tablet, Take 1 tablet by mouth Daily., Disp: , Rfl:     desvenlafaxine (PRISTIQ) 100 MG 24 hr tablet, , Disp: , Rfl:     fluticasone (FLONASE) 50 MCG/ACT nasal spray, Administer 2 sprays into the nostril(s) as directed by provider Daily., Disp: , Rfl:     glucose blood (OneTouch Verio) test strip, Use 1 each 4 (Four) Times a Day Before Meals & at Bedtime., Disp: 150 each, Rfl: 5    glucose blood test strip, 1 each by Other route 4 (Four) Times a Day Before Meals & at Bedtime., Disp: 400 each, Rfl: 5    glucose monitor monitoring kit, Use 1 each 4 (Four) Times a Day Before Meals & at Bedtime., Disp: 1 each, Rfl: 0    hydrOXYzine (ATARAX) 25 MG tablet, , Disp: , Rfl:     Lancets misc, Use 1 each 4 (Four) Times a Day Before Meals & at Bedtime., Disp: 200 each, Rfl: 5    Melatonin 5 MG chewable tablet, Chew 1 tablet 2 (Two) Times a Day., Disp: , Rfl:     norethindrone-ethinyl estradiol-iron (MICROGESTIN FE1.5/30) 1.5-30 MG-MCG tablet, , Disp: , Rfl:     OneTouch Delica Lancets 33G misc, Use 1 each 4 (Four) Times a Day Before Meals & at Bedtime., Disp: 150 each, Rfl: 5    polyethylene glycol (MiraLax) 17 g packet, Take 17 g by mouth Daily As Needed (Constipation)., Disp: 100 packet, Rfl: 5    propranolol LA (INDERAL LA) 60 MG 24 hr capsule, , Disp: , Rfl:     topiramate (TOPAMAX) 100 MG tablet, , Disp: , Rfl:     Continuous Glucose Sensor (FreeStyle Stanley 3 Plus Sensor), Used to monitor blood sugars., Disp: 6 each, Rfl: 3     Insulin Glargine (LANTUS SOLOSTAR) 100 UNIT/ML injection pen, Inject 5 Units under the skin into the appropriate area as directed Every Night., Disp: 15 mL, Rfl: 1    Insulin Lispro, 1 Unit Dial, (HUMALOG) 100 UNIT/ML solution pen-injector, Inject 2 Units under the skin into the appropriate area as directed 3 (Three) Times a Day With Meals., Disp: 15 mL, Rfl: 1    Insulin Pen Needle (Pen Needles) 32G X 4 MM misc, Use 1 each 4 (Four) Times a Day Before Meals & at Bedtime., Disp: 400 each, Rfl: 1    rizatriptan (MAXALT) 10 MG tablet, Take 1 tablet by mouth 1 (One) Time As Needed., Disp: , Rfl:     ==========================================================================    ALLERGIES    No Known Allergies    ==========================================================================    OBJECTIVE    Vitals:    07/08/25 0906   BP: 118/80   Pulse: 104   SpO2: 98%     Body mass index is 45.03 kg/m².     General: Alert, cooperative, no acute distress  Thyroid:  no enlargement/tenderness/palpable nodules  Lungs: Clear to auscultation bilaterally, respirations unlabored  Heart: Regular rate and rhythm, S1 and S2 normal, no murmur, rub or gallop  Abdomen: Soft, NT, ND and Bowel sounds Positive  Extremities:  Extremities normal, atraumatic, no cyanosis or edema    ==========================================================================    LAB EVALUATION    Lab Results   Component Value Date    GLUCOSE 106 (H) 11/01/2018    BUN 5 (L) 11/01/2018    CREATININE 0.9 11/01/2018    BCR 5.6 11/01/2018    K 3.8 11/01/2018    CO2 26 11/01/2018    CALCIUM 8.7 (L) 11/01/2018    ALBUMIN 3.7 11/01/2018    AST 22 11/01/2018    ALT 18 11/01/2018    CHOL 182 02/13/2018    TRIG 40 02/13/2018    HDL 47 02/13/2018     (H) 02/13/2018     Lab Results   Component Value Date    HGBA1C 5.40 04/05/2024    HGBA1C 5.50 11/13/2023    HGBA1C 5.5 03/12/2018     Lab Results   Component Value Date    CREATININE 0.9 11/01/2018              ==========================================================================    CGM Data:    Dates: Jun 25 - Jul 8, 2025    Very High > 250: 0%  High 180 - 250: 2%  Target: 70 - 180: 96%  Low 55 - 70:  2%  Very Low < 55: 0%    ==========================================================================    ASSESSMENT AND PLAN    # Gestational Diabetes Mellitus  # 2st Trimester of Pregnancy  # Morbid obesity with BMI 45.03    - Will start patient on basal bolus insulin therapy  - Patient to continue checking blood sugar fasting and 2-hour after each meal and keep sending the blood sugar logs to the office every Monday morning for us to review and adjust therapy accordingly  - Patient is currently using blood sugar monitoring through sample provided but will send the prescription for freestyle tommy 3+ as well  - Patient to continue 3 meals a day with a bedtime snack  - Continue exercise  - Patient having a block she is at home  - Therapy as follows:  Insulin Lantus 5 units nightly  Insulin lispro 2 units with breakfast and dinnertime  - Clinical follow-up in 4 weeks and patient to keep sending blood sugars on weekly basis    Return to clinic: 4 weeks    Entire assessment and plan was discussed and counseled the patient in detail to which patient verbalized understanding and agreed with care.  Answered all queries and concerns.    Part of this note may be an electronic transcription/translation of spoken language to printed text using the Dragon Dictation System.     Note: Portions of this note may have been copied from previous notes but documentation have been reviewed and edited as necessary to support clinical decision making for today's visit.    ==========================================================================    INFORMATION PROVIDED TO PATIENT    Patient Instructions   Please,    - Continue 3 meals a day with a bedtime snack.  - Continue check your blood sugar either through freestyle tommy or  through fingersticks: Fasting and 2-hour after breakfast, lunch and dinner.  - You have to maintain the log of these blood sugar and sent to the office every Monday morning for us to review and adjust therapy accordingly.    - Start taking insulin therapy as follows:  Insulin glargine 5 units every night at bedtime  Insulin lispro 2 units with breakfast and dinner    - Meal time insulin need to be taken 15 mins before meal. You can bring your insulin with you if you are planning to eat out.    - If you plan to miss the meal please miss the meal time insulin as well.    - Since you are starting insulin therapy you are always going to be at risk for having low blood sugar please always carry some source of sugar with you.    Follow-up in 4 weeks time.    Thank you for your visit today.    If you have any questions or concerns please feel free to reach out of the office.       ==========================================================================  Izaiah Patton MD  Department of Endocrine, Diabetes and Metabolism  [facility], [place]  ==========================================================================

## 2025-07-08 NOTE — PATIENT INSTRUCTIONS
Please,    - Continue 3 meals a day with a bedtime snack.  - Continue check your blood sugar either through freestyle tommy or through fingersticks: Fasting and 2-hour after breakfast, lunch and dinner.  - You have to maintain the log of these blood sugar and sent to the office every Monday morning for us to review and adjust therapy accordingly.    - Start taking insulin therapy as follows:  Insulin glargine 5 units every night at bedtime  Insulin lispro 2 units with breakfast and dinner    - Meal time insulin need to be taken 15 mins before meal. You can bring your insulin with you if you are planning to eat out.    - If you plan to miss the meal please miss the meal time insulin as well.    - Since you are starting insulin therapy you are always going to be at risk for having low blood sugar please always carry some source of sugar with you.    Follow-up in 4 weeks time.    Thank you for your visit today.    If you have any questions or concerns please feel free to reach out of the office.

## 2025-07-16 ENCOUNTER — TELEPHONE (OUTPATIENT)
Dept: ENDOCRINOLOGY | Facility: CLINIC | Age: 30
End: 2025-07-16
Payer: MEDICAID

## 2025-07-22 ENCOUNTER — TELEPHONE (OUTPATIENT)
Dept: ENDOCRINOLOGY | Facility: CLINIC | Age: 30
End: 2025-07-22
Payer: MEDICAID

## 2025-07-22 NOTE — TELEPHONE ENCOUNTER
Returned call and instructed pt per Dr Patton's recs:     Continue 5 units of Lantus nightly.   Increase Lispro to 3 units before breakfast.  Start 2 units of Lispro before lunch.   Increase Lispro to 3 units before supper.     Pt understands and agrees to treatment plan. Pt will continue sending weekly labs for review. Pt wondering if insurance will cover CGM.

## 2025-07-22 NOTE — TELEPHONE ENCOUNTER
Pt sent in BS     Lantus 5 units every night     Lispro  Inject 2 Units under the skin into the appropriate area as directed 3 (Three) Times a Day With Meals.     Please advise:         Per Dr Patton     Lispro 3 units breakfast, 2 units at lunch, 3 units dinner    Lantus 5 units night

## 2025-07-28 ENCOUNTER — TELEPHONE (OUTPATIENT)
Dept: ENDOCRINOLOGY | Facility: CLINIC | Age: 30
End: 2025-07-28
Payer: MEDICAID

## 2025-07-28 DIAGNOSIS — O24.414 INSULIN CONTROLLED GESTATIONAL DIABETES MELLITUS (GDM) IN SECOND TRIMESTER: ICD-10-CM

## 2025-07-28 NOTE — TELEPHONE ENCOUNTER
Hub staff attempted to follow warm transfer process and was unsuccessful     Caller: Lizette Jones    Relationship to patient: Self    Best call back number: [phone]    Patient is needing: CALLING TO CHECK STATUS OF HER TRANG 3 SENSOR PRESCRIPTION AND IF IT WAS APPROVED BY INSURANCE, IF NOT SHE WANTS TO SEE IF SAMPLES ARE AVAILABLE

## 2025-07-28 NOTE — TELEPHONE ENCOUNTER
Please call patient to adjust insulin therapy as follows:  Insulin Lantus decreased to 3 units every night  Insulin lispro increased to 3 units with each meal  Monitor blood sugar fasting and 2 after each meal.  Maintain the log of these blood sugar and sent to the office every Monday morning for us to review and adjust therapy accordingly.    Izaiah Patton MD  16:53 EDT  07/28/25

## 2025-07-28 NOTE — TELEPHONE ENCOUNTER
Pt sent in BS reading for view    Lantus  Inject 5 Units under the skin into the appropriate area as directed Every Night.     Humalog Inject 2 Units under the skin into the appropriate area as directed 3 (Three) Times a Day With Meals.     Please advise

## 2025-07-29 RX ORDER — HYDROCHLOROTHIAZIDE 12.5 MG/1
CAPSULE ORAL
Qty: 6 EACH | Refills: 3 | Status: SHIPPED | OUTPATIENT
Start: 2025-07-29

## 2025-07-29 NOTE — TELEPHONE ENCOUNTER
Caller: Lizette Jonse    Relationship: Self    Best call back number:   Telephone Information:   Mobile [phone]     What was the call regarding: PT CALLED CHECKING STATUS OF PREVIOUS. PT RECEIVED A LETTER FROM HER INSURANCE STATING PRESCRIPTION WAS DENIED FOR TRANG 3 SENSOR, PT THINKS PRESCRIPTION MAY NEED TO BE RESENT , PT ALSO WONDERING IF SHE CAN GET SAMPLES FOR SENSORS.   PLEASE ADVISE.

## 2025-07-29 NOTE — TELEPHONE ENCOUNTER
Prescription resent to the pharmacy.  Patient can come to the office tomorrow to  samples of freestyle tommy 3.

## 2025-08-01 RX ORDER — ACYCLOVIR 400 MG/1
1 TABLET ORAL
Qty: 3 EACH | Refills: 2 | Status: SHIPPED | OUTPATIENT
Start: 2025-08-01

## 2025-08-01 RX ORDER — ACYCLOVIR 400 MG/1
1 TABLET ORAL DAILY
Qty: 1 EACH | Refills: 0 | Status: SHIPPED | OUTPATIENT
Start: 2025-08-01

## 2025-08-05 ENCOUNTER — OFFICE VISIT (OUTPATIENT)
Dept: ENDOCRINOLOGY | Facility: CLINIC | Age: 30
End: 2025-08-05
Payer: MEDICAID

## 2025-08-05 VITALS
BODY MASS INDEX: 45.32 KG/M2 | OXYGEN SATURATION: 97 % | WEIGHT: 282 LBS | HEIGHT: 66 IN | HEART RATE: 100 BPM | SYSTOLIC BLOOD PRESSURE: 138 MMHG | DIASTOLIC BLOOD PRESSURE: 78 MMHG

## 2025-08-05 DIAGNOSIS — O24.414 INSULIN CONTROLLED GESTATIONAL DIABETES MELLITUS (GDM) IN SECOND TRIMESTER: Primary | ICD-10-CM

## 2025-08-05 DIAGNOSIS — Z34.92 SECOND TRIMESTER PREGNANCY: ICD-10-CM

## 2025-08-05 DIAGNOSIS — E66.01 MORBID OBESITY: ICD-10-CM

## 2025-08-05 PROCEDURE — 99214 OFFICE O/P EST MOD 30 MIN: CPT | Performed by: INTERNAL MEDICINE

## 2025-08-05 PROCEDURE — 1159F MED LIST DOCD IN RCRD: CPT | Performed by: INTERNAL MEDICINE

## 2025-08-05 PROCEDURE — 95251 CONT GLUC MNTR ANALYSIS I&R: CPT | Performed by: INTERNAL MEDICINE

## 2025-08-05 PROCEDURE — 1160F RVW MEDS BY RX/DR IN RCRD: CPT | Performed by: INTERNAL MEDICINE

## 2025-08-05 RX ORDER — INSULIN LISPRO 100 [IU]/ML
4 INJECTION, SOLUTION INTRAVENOUS; SUBCUTANEOUS
Qty: 15 ML | Refills: 1 | Status: SHIPPED | OUTPATIENT
Start: 2025-08-05

## 2025-08-11 ENCOUNTER — TELEPHONE (OUTPATIENT)
Dept: ENDOCRINOLOGY | Facility: CLINIC | Age: 30
End: 2025-08-11
Payer: MEDICAID

## 2025-08-18 ENCOUNTER — TELEPHONE (OUTPATIENT)
Dept: ENDOCRINOLOGY | Facility: CLINIC | Age: 30
End: 2025-08-18
Payer: MEDICAID

## 2025-08-18 DIAGNOSIS — O24.414 INSULIN CONTROLLED GESTATIONAL DIABETES MELLITUS (GDM) IN SECOND TRIMESTER: ICD-10-CM

## 2025-08-21 RX ORDER — INSULIN LISPRO 100 [IU]/ML
7 INJECTION, SOLUTION INTRAVENOUS; SUBCUTANEOUS
Qty: 15 ML | Refills: 1 | Status: SHIPPED | OUTPATIENT
Start: 2025-08-21